# Patient Record
Sex: MALE | URBAN - METROPOLITAN AREA
[De-identification: names, ages, dates, MRNs, and addresses within clinical notes are randomized per-mention and may not be internally consistent; named-entity substitution may affect disease eponyms.]

---

## 2017-12-28 ENCOUNTER — TELEPHONE (OUTPATIENT)
Dept: BEHAVIORAL HEALTH | Facility: CLINIC | Age: 67
End: 2017-12-28

## 2017-12-28 ENCOUNTER — HOSPITAL ENCOUNTER (INPATIENT)
Facility: CLINIC | Age: 67
LOS: 13 days | Discharge: NURSING, REHAB OR LONG-TERM CARE | DRG: 884 | End: 2018-01-10
Attending: EMERGENCY MEDICINE | Admitting: PSYCHIATRY & NEUROLOGY
Payer: COMMERCIAL

## 2017-12-28 DIAGNOSIS — R46.89 PHYSICALLY AGGRESSIVE BEHAVIOR: ICD-10-CM

## 2017-12-28 DIAGNOSIS — R41.89 COGNITIVE IMPAIRMENT: ICD-10-CM

## 2017-12-28 DIAGNOSIS — R45.1 AGITATION: ICD-10-CM

## 2017-12-28 DIAGNOSIS — F84.0 ACTIVE AUTISTIC DISORDER: Primary | ICD-10-CM

## 2017-12-28 LAB
ANION GAP SERPL CALCULATED.3IONS-SCNC: 7 MMOL/L (ref 3–14)
BASOPHILS # BLD AUTO: 0 10E9/L (ref 0–0.2)
BASOPHILS NFR BLD AUTO: 0.3 %
BUN SERPL-MCNC: 14 MG/DL (ref 7–30)
CALCIUM SERPL-MCNC: 9.4 MG/DL (ref 8.5–10.1)
CHLORIDE SERPL-SCNC: 108 MMOL/L (ref 94–109)
CO2 SERPL-SCNC: 25 MMOL/L (ref 20–32)
CREAT SERPL-MCNC: 0.77 MG/DL (ref 0.66–1.25)
DIFFERENTIAL METHOD BLD: NORMAL
EOSINOPHIL # BLD AUTO: 0 10E9/L (ref 0–0.7)
EOSINOPHIL NFR BLD AUTO: 0.3 %
ERYTHROCYTE [DISTWIDTH] IN BLOOD BY AUTOMATED COUNT: 12.9 % (ref 10–15)
GFR SERPL CREATININE-BSD FRML MDRD: >90 ML/MIN/1.7M2
GLUCOSE SERPL-MCNC: 91 MG/DL (ref 70–99)
HCT VFR BLD AUTO: 43 % (ref 40–53)
HGB BLD-MCNC: 14.8 G/DL (ref 13.3–17.7)
IMM GRANULOCYTES # BLD: 0 10E9/L (ref 0–0.4)
IMM GRANULOCYTES NFR BLD: 0.2 %
LYMPHOCYTES # BLD AUTO: 1.1 10E9/L (ref 0.8–5.3)
LYMPHOCYTES NFR BLD AUTO: 17.8 %
MCH RBC QN AUTO: 31.9 PG (ref 26.5–33)
MCHC RBC AUTO-ENTMCNC: 34.4 G/DL (ref 31.5–36.5)
MCV RBC AUTO: 93 FL (ref 78–100)
MONOCYTES # BLD AUTO: 0.5 10E9/L (ref 0–1.3)
MONOCYTES NFR BLD AUTO: 8.2 %
NEUTROPHILS # BLD AUTO: 4.5 10E9/L (ref 1.6–8.3)
NEUTROPHILS NFR BLD AUTO: 73.2 %
NRBC # BLD AUTO: 0 10*3/UL
NRBC BLD AUTO-RTO: 0 /100
PLATELET # BLD AUTO: 211 10E9/L (ref 150–450)
POTASSIUM SERPL-SCNC: 4 MMOL/L (ref 3.4–5.3)
RBC # BLD AUTO: 4.64 10E12/L (ref 4.4–5.9)
SODIUM SERPL-SCNC: 140 MMOL/L (ref 133–144)
TSH SERPL DL<=0.005 MIU/L-ACNC: 1.36 MU/L (ref 0.4–4)
WBC # BLD AUTO: 6.1 10E9/L (ref 4–11)

## 2017-12-28 PROCEDURE — 99223 1ST HOSP IP/OBS HIGH 75: CPT | Performed by: NURSE PRACTITIONER

## 2017-12-28 PROCEDURE — 80048 BASIC METABOLIC PNL TOTAL CA: CPT | Performed by: EMERGENCY MEDICINE

## 2017-12-28 PROCEDURE — 99285 EMERGENCY DEPT VISIT HI MDM: CPT | Mod: 25

## 2017-12-28 PROCEDURE — 12400006 ZZH R&B MH INTERMEDIATE

## 2017-12-28 PROCEDURE — 85025 COMPLETE CBC W/AUTO DIFF WBC: CPT | Performed by: EMERGENCY MEDICINE

## 2017-12-28 PROCEDURE — 25000128 H RX IP 250 OP 636: Performed by: EMERGENCY MEDICINE

## 2017-12-28 PROCEDURE — 84443 ASSAY THYROID STIM HORMONE: CPT | Performed by: EMERGENCY MEDICINE

## 2017-12-28 PROCEDURE — 99207 ZZC APP CREDIT; MD BILLING SHARED VISIT: CPT | Performed by: NURSE PRACTITIONER

## 2017-12-28 PROCEDURE — 40000914 ZZH STATISTIC SITTER, DAY HOURS

## 2017-12-28 PROCEDURE — 25000132 ZZH RX MED GY IP 250 OP 250 PS 637: Performed by: PSYCHIATRY & NEUROLOGY

## 2017-12-28 PROCEDURE — 96372 THER/PROPH/DIAG INJ SC/IM: CPT

## 2017-12-28 RX ORDER — HALOPERIDOL 5 MG/ML
10 INJECTION INTRAMUSCULAR ONCE
Status: COMPLETED | OUTPATIENT
Start: 2017-12-28 | End: 2017-12-28

## 2017-12-28 RX ORDER — CHLORHEXIDINE GLUCONATE ORAL RINSE 1.2 MG/ML
15 SOLUTION DENTAL 2 TIMES DAILY
Status: DISCONTINUED | OUTPATIENT
Start: 2017-12-28 | End: 2018-01-10 | Stop reason: HOSPADM

## 2017-12-28 RX ORDER — RISPERIDONE 1 MG/1
1 TABLET, ORALLY DISINTEGRATING ORAL EVERY MORNING
Status: DISCONTINUED | OUTPATIENT
Start: 2017-12-29 | End: 2018-01-10 | Stop reason: HOSPADM

## 2017-12-28 RX ORDER — TRIAMCINOLONE ACETONIDE 1 MG/G
CREAM TOPICAL 3 TIMES DAILY PRN
COMMUNITY

## 2017-12-28 RX ORDER — TRIAMCINOLONE ACETONIDE 1 MG/G
CREAM TOPICAL 3 TIMES DAILY PRN
Status: DISCONTINUED | OUTPATIENT
Start: 2017-12-28 | End: 2018-01-10 | Stop reason: HOSPADM

## 2017-12-28 RX ORDER — OLANZAPINE 5 MG/1
5-10 TABLET, ORALLY DISINTEGRATING ORAL EVERY 6 HOURS PRN
Status: DISCONTINUED | OUTPATIENT
Start: 2017-12-28 | End: 2018-01-02 | Stop reason: CLARIF

## 2017-12-28 RX ORDER — LEVOTHYROXINE SODIUM 50 UG/1
50 TABLET ORAL DAILY
Status: DISCONTINUED | OUTPATIENT
Start: 2017-12-29 | End: 2018-01-10 | Stop reason: HOSPADM

## 2017-12-28 RX ORDER — RISPERIDONE 1 MG/1
3 TABLET, ORALLY DISINTEGRATING ORAL AT BEDTIME
Status: DISCONTINUED | OUTPATIENT
Start: 2017-12-28 | End: 2018-01-10 | Stop reason: HOSPADM

## 2017-12-28 RX ORDER — DIPHENHYDRAMINE HCL 25 MG
50 CAPSULE ORAL EVERY 6 HOURS PRN
Status: DISCONTINUED | OUTPATIENT
Start: 2017-12-28 | End: 2018-01-10 | Stop reason: HOSPADM

## 2017-12-28 RX ORDER — BENZOCAINE/MENTHOL 6 MG-10 MG
LOZENGE MUCOUS MEMBRANE 4 TIMES DAILY PRN
Status: DISCONTINUED | OUTPATIENT
Start: 2017-12-28 | End: 2018-01-10 | Stop reason: HOSPADM

## 2017-12-28 RX ORDER — LORAZEPAM 2 MG/ML
2 INJECTION INTRAMUSCULAR ONCE
Status: COMPLETED | OUTPATIENT
Start: 2017-12-28 | End: 2017-12-28

## 2017-12-28 RX ORDER — SIMVASTATIN 10 MG
20 TABLET ORAL AT BEDTIME
Status: DISCONTINUED | OUTPATIENT
Start: 2017-12-28 | End: 2018-01-10 | Stop reason: HOSPADM

## 2017-12-28 RX ORDER — DORZOLAMIDE HYDROCHLORIDE AND TIMOLOL MALEATE 20; 5 MG/ML; MG/ML
1 SOLUTION/ DROPS OPHTHALMIC
Status: DISCONTINUED | OUTPATIENT
Start: 2017-12-28 | End: 2018-01-10 | Stop reason: HOSPADM

## 2017-12-28 RX ORDER — METOPROLOL SUCCINATE 25 MG/1
25 TABLET, EXTENDED RELEASE ORAL DAILY
Status: DISCONTINUED | OUTPATIENT
Start: 2017-12-29 | End: 2018-01-10 | Stop reason: HOSPADM

## 2017-12-28 RX ORDER — DIVALPROEX SODIUM 500 MG/1
1000 TABLET, EXTENDED RELEASE ORAL ONCE
Status: COMPLETED | OUTPATIENT
Start: 2017-12-28 | End: 2017-12-28

## 2017-12-28 RX ORDER — OLANZAPINE 10 MG/2ML
5-10 INJECTION, POWDER, FOR SOLUTION INTRAMUSCULAR EVERY 6 HOURS PRN
Status: DISCONTINUED | OUTPATIENT
Start: 2017-12-28 | End: 2018-01-01

## 2017-12-28 RX ORDER — RISPERIDONE 1 MG/1
3 TABLET, ORALLY DISINTEGRATING ORAL ONCE
Status: COMPLETED | OUTPATIENT
Start: 2017-12-28 | End: 2017-12-28

## 2017-12-28 RX ORDER — SIMVASTATIN 10 MG
20 TABLET ORAL DAILY
Status: DISCONTINUED | OUTPATIENT
Start: 2017-12-29 | End: 2017-12-28

## 2017-12-28 RX ADMIN — HALOPERIDOL LACTATE 10 MG: 5 INJECTION, SOLUTION INTRAMUSCULAR at 10:52

## 2017-12-28 RX ADMIN — RISPERIDONE 3 MG: 1 TABLET, ORALLY DISINTEGRATING ORAL at 14:01

## 2017-12-28 RX ADMIN — DIPHENHYDRAMINE HYDROCHLORIDE 50 MG: 25 CAPSULE ORAL at 14:44

## 2017-12-28 RX ADMIN — LORAZEPAM 2 MG: 2 INJECTION INTRAMUSCULAR; INTRAVENOUS at 10:52

## 2017-12-28 RX ADMIN — DIVALPROEX SODIUM 1000 MG: 500 TABLET, EXTENDED RELEASE ORAL at 14:01

## 2017-12-28 RX ADMIN — SIMVASTATIN 20 MG: 10 TABLET, FILM COATED ORAL at 20:44

## 2017-12-28 RX ADMIN — OLANZAPINE 10 MG: 5 TABLET, ORALLY DISINTEGRATING ORAL at 18:18

## 2017-12-28 RX ADMIN — RISPERIDONE 3 MG: 1 TABLET, ORALLY DISINTEGRATING ORAL at 20:44

## 2017-12-28 RX ADMIN — DORZOLAMIDE HYDROCHLORIDE AND TIMOLOL MALEATE 1 DROP: 20; 5 SOLUTION/ DROPS OPHTHALMIC at 17:08

## 2017-12-28 ASSESSMENT — ACTIVITIES OF DAILY LIVING (ADL)
GROOMING: WITH ASSISTANCE
LAUNDRY: WITH SUPERVISION
ORAL_HYGIENE: WITH ASSISTANCE
DRESS: WITH ASSISTANCE

## 2017-12-28 NOTE — PROGRESS NOTES
Direct admit from [facility] and is from a Group Home. Has a medical history of blind and developmentally delayed. For past month has been acting out,yelling loudly,not sleeping, stripping down naked and defecating on floor. Long time care by Dr Kwong. Legal guardian is Bernadette Raymundo [phone]. Group Home is [phone] and [facility] is [phone]. Patient  need constant care to monitor patient's safety.    Wel 77.Welcome packet reviewed with patient. Information reviewed includes getting emergency help, preventing infections, understanding your care, using medication safely, reducing falls, preventing pressure ulcers, smoking cessation, powerful choices and Patients Bill of Rights. Pt. given tour of the unit and instruction on use of facility including emergency call light. Program schedule reviewed with patient. Questions regarding the unit addressed. Pt. Search completed and belongings inventoried.      Adm 77...Nursing assessment complete including patient and medication profiles. Risk assessments completed addressing suicide,fall,skin,nutrition and safety issues. Care plan initiated. Assessments reviewed with physician and admit orders received.

## 2017-12-28 NOTE — ED PROVIDER NOTES
History     Chief Complaint:  Psychiatric Evaluation        HPI   Hx limited by his baseline condition.  Supplemented by review of his electronic charts and discussion with his psychiatrist and group home staff.    Hugh Molina is a 67 year old male who presents with his group home staff for mental health evaluation.  He has a history of mental retardation and blindness and is followed by Dr. Justice Castaneda with psychiatry, who has been titrating his risperidone doses recently.  The patient has a history of difficult to control behavior, such as rolling around on the ground inappropriately.  Per group home staff, these behaviors have become more frequent in the past few months.  More recently he has been punching walls frequently, undressing at inappropriate times, and defecating on the floor of his facility.  His staff was in touch with Dr. Castnaeda, who directed them to the [facility] for hospitalization based on these worrisome symptoms.    Allergies:      NKDA    Medications:      No current outpatient prescriptions on file.    Past Medical History:    Past Medical History:   Diagnosis Date     Autistic disorder      Blind      MR (mental retardation)        Patient Active Problem List    Diagnosis Date Noted     Agitation 12/28/2017     Priority: Medium        Past Surgical History:    Past Surgical History:   Procedure Laterality Date     EXAM UNDER ANESTHESIA DENTAL  9/28/2012    Procedure: EXAM UNDER ANESTHESIA DENTAL;  Comprehensive Exam under Anesthesia, Full set of xrays, Periodontal Therapy;  Surgeon: Carmelita Kaminski DDS;  Location: [facility] OR     NO HISTORY OF SURGERY          Family History:    family history is not on file.    Social History:   reports that he has never smoked. He does not have any smokeless tobacco history on file. He reports that he does not drink alcohol or use illicit drugs.    PCP: Gregory Pool A     Review of Systems   Unable to perform ROS: Psychiatric disorder  "        Physical Exam     Patient Vitals for the past 24 hrs:   BP Temp Temp src Pulse Resp SpO2   12/28/17 1204 116/67 97.9  F (36.6  C) Temporal 84 18 96 %        Physical Exam  General: male walking in agitated manner in common space of mental health unit, 2 helpful female group home staff and security nearby, patient appears slightly disheveled  HENT: mucous membranes moist  CV: extremities well perfused, regular rhythm, regular rate  Resp:  normal effort, clear throughout  GI: abdomen soft and nontender, no guarding  MSK: no bony tenderness, no evidence of recent trauma  Skin: appropriately warm and dry  Neuro: walks with steady gait, blind at baseline,  symmetric, unable to follow elaborate commands, occasionally makes single word statements, such as \"shot\" while rolling up his shirt sleeve and motioning toward deltoid  Psych: moderately agitated, at times difficult to redirect, unable to fully assess due to verbal limitations    Emergency Department Course     Laboratory:    Labs Ordered and Resulted from Time of ED Arrival Up to the Time of Departure from the ED   BASIC METABOLIC PANEL   CBC WITH PLATELETS DIFFERENTIAL   TSH WITH FREE T4 REFLEX   CARDIAC CONTINUOUS MONITORING   PERIPHERAL IV CATHETER        Interventions:  Haldol 10mg IM  Ativan 2mg IM     Emergency Department Course:  Past medical records, nursing notes, and vitals reviewed.    I spoke with Dr. Dotson, Psychiatrist, immediately prior to evaluating the patient in the behavioral health suite.  He relayed his request for hospitalization based on his knowledge of the patient including recent decompensation.  He recommends 10 mg of IM Haldol and 2 mg of IM Ativan    I performed an exam of the patient and obtained history, as documented above.    I reexamined the patient while after medications were administered.  He was still awake and talking though now much more calm.  No evidence of respiratory compromise or significant adverse " sedation.    He was admitted to an [facility] bed at [facility] on the mental health unit.    Impression & Plan       Medical Decision Making:  This gentleman with a complex history presents with gradual decompensation of his mental health leading to aggressive and agitated behaviors.  This is despite efforts by his outpatient team to titrate his medication regimen.  His psychiatrist and I agree that hospitalization is now appropriate.  Medical causes such as electrolyte abnormalities, infection, and intracranial hemorrhage were considered and are either ruled out by testing or are not suspected.  I consider him medically appropriate for mental health hospitalization.        Diagnosis:    ICD-10-CM    1. Physically aggressive behavior R46.89 Basic metabolic panel     TSH with free T4 reflex   2. Agitation R45.1    3. Cognitive impairment R41.89         12/28/2017   Bobby Maradiaga, *        Bobby Maradiaga MD  12/28/17 1522

## 2017-12-28 NOTE — PROGRESS NOTES
12/28/17 1308   Patient Belongings   Patient Belongings other (see comments)   Disposition of Belongings [facility]   Belongings Search Yes   Clothing Search Yes   Second Staff Tan     Winter coat  Duglas sweat pants with strings  T-shirt  Long sleeve  Shoes with laces  Pair of socks    ..A               Admission:  I am responsible for any personal items that are not sent to the safe or pharmacy.  [facility] is not responsible for loss, theft or damage of any property in my possession.    Signature:  _________________________________ Date: _______  Time: _____                                              Staff Signature:  ____________________________ Date: ________  Time: _____      2nd Staff person, if patient is unable/unwilling to sign:    Signature: ________________________________ Date: ________  Time: _____     Discharge:  [facility] has returned all of my personal belongings:    Signature: _________________________________ Date: ________  Time: _____                                          Staff Signature:  ____________________________ Date: ________  Time: _____

## 2017-12-28 NOTE — ED NOTES
Bed: [facility]  Expected date:   Expected time:   Means of arrival:   Comments:  triage

## 2017-12-28 NOTE — PROGRESS NOTES
I was asked by RN staff to evaluate patient following initiation of violent restraints x5 points.    Right sided extremity restraints were requested to be loosened slightly so as to fit 1-2 fingerbreaths easily under restraints which was completed in my presence.  L sided restraints and chest/axillary restraints were acceptable.    Staff shared w/ me protocol re: toileting, turning, circulation & skin checks.  Pt was calm at the time of the limited exam, wakes to verbal stimulation, had been recently sedated for risk of injury to self & others.     In person face to face assessment completed, including an evaluation of the patient's immediate reaction to the intervention,   The intervention of restraint needs to CONTINUE.

## 2017-12-28 NOTE — PROGRESS NOTES
"   12/28/17 1500   Seclusion or Restraint Order   In Person Face to Face Assessment Conducted Yes-Eval of pt's immediate situation, reaction to intervention, complete review of systems assessment, behavioral assessment & review/assessment of hx, drugs & meds, recent labs, etc, behavioral condition, need to continue/terminate restraint/seclusion   Patient was walking with staff. Patient kept on yelling \"van ride.\" Patient did not respond to reassurance. Began yelling and swinging at staff. Patient also hit himself in the head. Patient was placed in restraints for the safety of self and others. Patient was relatively cooperative through this process.  "

## 2017-12-28 NOTE — PROGRESS NOTES
Direct admit from [facility] and is from a Group Home. Has a medical history of blind and developmentally delayed. For past month has been acting out,yelling loudly,not sleeping, stripping down naked and defecating on floor. Long time care by Dr Kwong. Legal guardian is Bernadette Raymundo [phone]. Group Home is [phone] and [facility] is [phone]. Group Home address is [address] [place] [address]. Patient  needs constant supervision monitor patient's safety. Staff from [facility] called and stated they do not have any Care Plan for Jovon.  Adm 77..Nursing assessment complete including patient and medication profiles. Risk assessments completed addressing suicide,fall,skin,nutrition and safety issues. Care plan initiated. Assessments reviewed with physician and admit orders received.   Wel 77.Welcome packet reviewed with patient. Information reviewed includes getting emergency help, preventing infections, understanding your care, using medication safely, reducing falls, preventing pressure ulcers, smoking cessation, powerful choices and Patients Bill of Rights. Pt. given tour of the unit and instruction on use of facility including emergency call light. Program schedule reviewed with patient. Questions regarding the unit addressed. Pt. Search completed and belongings inventoried.    .

## 2017-12-28 NOTE — TELEPHONE ENCOUNTER
S: PT is a 68yo male that will be BIB group home staff to [facility] ED for IP MH     B: Dr. Kwong called and wants this pt admitted to [facility]. PT is currently residing at a group home. Pt has developmentally delayed and blind. Pt has a history of aggression. PT has also been known to be very loud, can strip naked, and defecate on the floor.     A:     R: Pt accepted to station 77 under [facility].

## 2017-12-28 NOTE — PHARMACY-ADMISSION MEDICATION HISTORY
Admission medication history interview status for the 12/28/2017  admission is complete. See EPIC admission navigator for prior to admission medications     Medication history source reliability:Good    Actions taken by pharmacist (provider contacted, etc): list from group home used     Additional medication history information not noted on PTA med list :None    Medication reconciliation/reorder completed by provider prior to medication history? No    Time spent in this activity: 20 min    Prior to Admission medications    Medication Sig Last Dose Taking? Auth Provider   LEVOTHYROXINE SODIUM PO Take 50 mcg by mouth daily 12/27/2017 at 0700 Yes Unknown, Entered By History   METOPROLOL SUCCINATE ER PO Take 25 mg by mouth daily 12/27/2017 at 0730 Yes Unknown, Entered By History   RISPERIDONE PO Take 1 mg by mouth every morning 12/27/2017 at 0700 Yes Unknown, Entered By History   triamcinolone (KENALOG) 0.1 % cream Apply topically 3 times daily as needed for irritation (rash) unknown prn Yes Unknown, Entered By History   Cholecalciferol (VITAMIN D3 PO) Take 2,000 Units by mouth daily  12/27/2017 at 0730 Yes Reported, Patient   chlorhexidine (PERIDEX) 0.12 % solution Take 15 mLs by mouth 2 times daily  12/27/2017 at 0730 Yes Reported, Patient   SIMVASTATIN PO Take 20 mg by mouth daily.   12/26/2017 at 2000 Yes Reported, Patient   dorzolamide-timolol (COSOPT) 2-0.5 % ophthalmic solution Place 1 drop into the right eye 2 times daily. 12/27/2017 at 0730 Yes Reported, Patient   hydrocortisone 1 % cream Apply topically 4 times daily as needed for itching  unknown prn Yes Reported, Patient   DiphenhydrAMINE HCl (BENADRYL PO) Take 50 mg by mouth every 6 hours as needed for itching (and rash)  unknown prn Yes Reported, Patient   RISPERIDONE PO Take 3 mg by mouth At Bedtime  12/26/2017 at 2000  Reported, Patient

## 2017-12-28 NOTE — PLAN OF CARE
Problem: Patient Care Overview  Goal: Team Discussion  Team Plan:   Outcome: No Change  BEHAVIORAL TEAM DISCUSSION    Participants: Dr. Kwong, nursing staff, , psych associates  Progress: Pt remains agitated, screaming. Displays inappropriate behaviors at times such as taking clothes off, defecating on the floor. Known to fall backwards at times. Non-responsive to questions.   Continued Stay Criteria/Rationale: Remains agitated, screaming, displays inappropriate behavior.   Medical/Physical: DD, blind   Precautions:   Behavioral Orders   Procedures     Assault precautions     Code 1 - Restrict to Unit     Routine Programming     As clinically indicated     Status 15     Every 15 minutes.     Plan: Stabilize on medications, and continue to monitor until reaches baseline (whirls/twirls around happily when at baseline).  Rationale for change in precautions or plan: Remains agitated, screaming, displays inappropriate behavior.

## 2017-12-28 NOTE — IP AVS SNAPSHOT
[place] [address] [place] [address]    Phone:  [phone]                                       After Visit Summary   12/28/2017    Hugh Molina    MRN: [id number]           After Visit Summary Signature Page     I have received my discharge instructions, and my questions have been answered. I have discussed any challenges I see with this plan with the nurse or doctor.    ..........................................................................................................................................  Patient/Patient Representative Signature      ..........................................................................................................................................  Patient Representative Print Name and Relationship to Patient    ..................................................               ................................................  Date                                            Time    ..........................................................................................................................................  Reviewed by Signature/Title    ...................................................              ..............................................  Date                                                            Time

## 2017-12-28 NOTE — IP AVS SNAPSHOT
MRN:[id number]                      After Visit Summary   12/28/2017    Hugh Molina    MRN: [id number]           Thank you!     Thank you for choosing [facility] for your care. Our goal is always to provide you with excellent care.        Patient Information     Date Of Birth          1950        Designated Caregiver       Most Recent Value    Caregiver    Will someone help with your care after discharge? yes    Name of designated caregiver Group Home    Phone number of caregiver [phone]    Caregiver address [place]      About your hospital stay     You were admitted on:  December 28, 2017 You last received care in the:  [facility]    You were discharged on:  January 10, 2018       Who to Call     For medical emergencies, please call 911.  For non-urgent questions about your medical care, please call your primary care provider or clinic, [phone]          Attending Provider     Provider Bobby Javed MD Emergency Medicine    [facility], Justice DICKINSON MD Psychiatry       Primary Care Provider Office Phone # Fax #    Gregory Pool [phone]      Further instructions from your care team       Behavioral Discharge Planning and Instructions    Summary:  Admitted with increasing aggressive behavior, agitation, inappropriate behavior and insomnia from his group home    Main Diagnosis:   Autism Spectrum; Intellectual Disability formerly known as MR; Likely Dementia; Blind    Major Treatments, Procedures and Findings:  Psychiatric assessment. Medication adjustment.     Symptoms to Report: Feeling more aggressive, Increased confusion, Losing more sleep, Mood getting worse or Thoughts of suicide    Lifestyle Adjustment:  Follow all treatment recommendations, including going to all doctor appointments and taking medications as prescribed. Develop and follow safety plan with group home staff.     Psychiatry Follow-up:     Your  "group home staff will assist you in setting up a follow up appointment with Dr. Justice Kwong at [facility] within the next 30 days so that you can get your medications refilled.     [facility]  [address], [address]  [place]  [address]  Phone:  [phone] / Fax:  [phone]    Your legal guardian is Bernadette Fonseca. She can be reached at [phone].     You will be returning to your current group home.  Main number for group home is [phone]. Contact at your group home is Becky and she can be reached at [phone].     Medications will be sent to [facility] in [place].     Resources:   Crisis Intervention: 535.197.8567 or 428-271-4631 (TTY: 319.682.9951).  Call anytime for help.  National [place] on Mental Illness (www.[place].laron.org): 219.340.7551 or 448-405-2240.  National Suicide Prevention Line (www.mentalhealthmn.org): 074-891-OSEB (8785)  COPE (Crisis Services for Adults) in [facility] [place]: 639.151.8131 (Available 24/7)    General Medication Instructions:   See your medication sheet(s) for instructions.   Take all medicines as directed.  Make no changes unless your doctor suggests them.   Go to all your doctor visits.  Be sure to have all your required lab tests. This way, your medicines can be refilled on time.  Do not use any drugs not prescribed by your doctor.  Avoid alcohol.    Weight: 1/3/18 165.8 - 169.98 1/10/18    To access medical records, please have the guardian fill out the \"Authorization for Protected Health Information.\" The number for Medical records is [phone]    Pending Results     No orders found from 12/26/2017 to 12/29/2017.            Statement of Approval     Ordered          01/09/18 1901  I have reviewed and agree with all the recommendations and orders detailed in this document.  EFFECTIVE NOW     Approved and electronically signed by:  Justice Kwong MD             Admission Information     Date & Time Provider " "Department Dept. Phone    2017 Justice Kwong MD [facility] [phone]      Your Vitals Were     Blood Pressure Pulse Temperature Respirations Height Weight    121/62 79 98.7  F (37.1  C) (Axillary) 17 1.778 m (5' 10\") 76.1 kg (167 lb 11.2 oz)    Pulse Oximetry BMI (Body Mass Index)                91% 24.06 kg/m2          [employer] Information     [employer] lets you send messages to your doctor, view your test results, renew your prescriptions, schedule appointments and more. To sign up, go to [email or web address] . Click on \"Log in\" on the left side of the screen, which will take you to the Welcome page. Then click on \"Sign up Now\" on the right side of the page.     You will be asked to enter the access code listed below, as well as some personal information. Please follow the directions to create your username and password.     Your access code is: [email or web address]  Expires: 2018  3:30 PM     Your access code will  in 90 days. If you need help or a new code, please call your [facility] clinic or [phone].        Care EveryWhere ID     This is your Care EveryWhere ID. This could be used by other organizations to access your [facility] medical records  [id number]        Equal Access to Services     [facility] AH: Hadii laurie taylor Sobahman, waaxda luqadaha, qaybta kaalmaarminda james, sharif will . [facility] [phone].    ATENCIÓN: Si habla español, tiene a jimenez disposición servicios gratuitos de asistencia lingüística. Llame al [phone].    We comply with applicable federal civil rights laws and [place] laws. We do not discriminate on the basis of race, color, national origin, age, disability, sex, sexual orientation, or gender identity.               Review of your medicines      START taking        Dose / Directions    amoxicillin-clavulanate 875-125 MG per tablet   Commonly known as:  AUGMENTIN   Indication:  Pneumonia caused by " Inhaling a Substance Into the Lungs   Used for:  Meconium aspiration pneumonia, unspecified laterality, unspecified part of lung        Dose:  1 tablet   Take 1 tablet by mouth every 12 hours   Quantity:  1 tablet   Refills:  0       divalproex 500 MG 24 hr tablet   Commonly known as:  DEPAKOTE ER   Used for:  Active autistic disorder        Dose:  1000 mg   Take 2 tablets (1,000 mg) by mouth every evening   Quantity:  60 tablet   Refills:  0       QUEtiapine 100 MG tablet   Commonly known as:  SEROquel   Used for:  Active autistic disorder        Dose:  100 mg   Take 1 tablet (100 mg) by mouth 3 times daily   Quantity:  90 tablet   Refills:  0         CONTINUE these medicines which may have CHANGED, or have new prescriptions. If we are uncertain of the size of tablets/capsules you have at home, strength may be listed as something that might have changed.        Dose / Directions    * risperiDONE 3 MG Tbdp ODT tab   This may have changed:    - medication strength  - how to take this   Used for:  Active autistic disorder        Dose:  3 mg   Place 1 tablet (3 mg) under the tongue At Bedtime   Quantity:  30 tablet   Refills:  0       * risperiDONE 1 MG ODT tab   Commonly known as:  risperDAL M-TABS   This may have changed:  medication strength   Used for:  Active autistic disorder        Dose:  1 mg   Take 1 tablet (1 mg) by mouth every morning   Quantity:  30 tablet   Refills:  0       * Notice:  This list has 2 medication(s) that are the same as other medications prescribed for you. Read the directions carefully, and ask your doctor or other care provider to review them with you.      CONTINUE these medicines which have NOT CHANGED        Dose / Directions    BENADRYL PO        Dose:  50 mg   Take 50 mg by mouth every 6 hours as needed for itching (and rash)   Refills:  0       chlorhexidine 0.12 % solution   Commonly known as:  PERIDEX        Dose:  15 mL   Take 15 mLs by mouth 2 times daily   Refills:  0        COSOPT 2-0.5 % ophthalmic solution   Generic drug:  dorzolamide-timolol        Dose:  1 drop   Place 1 drop into the right eye 2 times daily.   Refills:  0       hydrocortisone 1 % cream   Commonly known as:  CORTAID        Apply topically 4 times daily as needed for itching   Refills:  0       LEVOTHYROXINE SODIUM PO        Dose:  50 mcg   Take 50 mcg by mouth daily   Refills:  0       METOPROLOL SUCCINATE ER PO        Dose:  25 mg   Take 25 mg by mouth daily   Refills:  0       SIMVASTATIN PO        Dose:  20 mg   Take 20 mg by mouth daily.   Refills:  0       triamcinolone 0.1 % cream   Commonly known as:  KENALOG        Apply topically 3 times daily as needed for irritation (rash)   Refills:  0       VITAMIN D3 PO        Dose:  2000 Units   Take 2,000 Units by mouth daily   Refills:  0            Where to get your medicines      These medications were sent to [facility] - [place], [place] - [address]  [address], [facility] [address]     Phone:  [phone]     amoxicillin-clavulanate 875-125 MG per tablet    divalproex 500 MG 24 hr tablet    QUEtiapine 100 MG tablet    risperiDONE 1 MG ODT tab    risperiDONE 3 MG Tbdp ODT tab               ANTIBIOTIC INSTRUCTION     You've Been Prescribed an Antibiotic - Now What?  Your healthcare team thinks that you or your loved one might have an infection. Some infections can be treated with antibiotics, which are powerful, life-saving drugs. Like all medications, antibiotics have side effects and should only be used when necessary. There are some important things you should know about your antibiotic treatment.      Your healthcare team may run tests before you start taking an antibiotic.    Your team may take samples (e.g., from your blood, urine or other areas) to run tests to look for bacteria. These test can be important to determine if you need an antibiotic at all and, if you do, which antibiotic will work best.      Within a few  days, your healthcare team might change or even stop your antibiotic.    Your team may start you on an antibiotic while they are working to find out what is making you sick.    Your team might change your antibiotic because test results show that a different antibiotic would be better to treat your infection.    In some cases, once your team has more information, they learn that you do not need an antibiotic at all. They may find out that you don't have an infection, or that the antibiotic you're taking won't work against your infection. For example, an infection caused by a virus can't be treated with antibiotics. Staying on an antibiotic when you don't need it is more likely to be harmful than helpful.      You may experience side effects from your antibiotic.    Like all medications, antibiotics have side effects. Some of these can be serious.    Let you healthcare team know if you have any known allergies when you are admitted to the hospital.    One significant side effect of nearly all antibiotics is the risk of severe and sometimes deadly diarrhea caused by Clostridium difficile (C. Difficile). This occurs when a person takes antibiotics because some good germs are destroyed. Antibiotic use allows C. diificile to take over, putting patients at high risk for this serious infection.    As a patient or caregiver, it is important to understand your or your loved one's antibiotic treatment. It is especially important for caregivers to speak up when patients can't speak for themselves. Here are some important questions to ask your healthcare team.    What infection is this antibiotic treating and how do you know I have that infection?    What side effects might occur from this antibiotic?    How long will I need to take this antibiotic?    Is it safe to take this antibiotic with other medications or supplements (e.g., vitamins) that I am taking?     Are there any special directions I need to know about taking this  antibiotic? For example, should I take it with food?    How will I be monitored to know whether my infection is responding to the antibiotic?    What tests may help to make sure the right antibiotic is prescribed for me?      Information provided by:  www.cdc.gov/getsmart  U.S. Department of Health and Human Services  Centers for disease Control and Prevention  National Center for Emerging and Zoonotic Infectious Diseases  Division of Healthcare Quality Promotion         Protect others around you: Learn how to safely use, store and throw away your medicines at www.disposemymeds.org.             Medication List: This is a list of all your medications and when to take them. Check marks below indicate your daily home schedule. Keep this list as a reference.      Medications           Morning Afternoon Evening Bedtime As Needed    amoxicillin-clavulanate 875-125 MG per tablet   Commonly known as:  AUGMENTIN   Take 1 tablet by mouth every 12 hours   Last time this was given:  1 tablet on 1/10/2018  8:27 AM                                BENADRYL PO   Take 50 mg by mouth every 6 hours as needed for itching (and rash)   Last time this was given:  50 mg on 1/1/2018  7:50 AM                                chlorhexidine 0.12 % solution   Commonly known as:  PERIDEX   Take 15 mLs by mouth 2 times daily   Last time this was given:  15 mLs on 1/10/2018  8:27 AM                                COSOPT 2-0.5 % ophthalmic solution   Place 1 drop into the right eye 2 times daily.   Last time this was given:  1 drop on 1/10/2018  8:27 AM   Generic drug:  dorzolamide-timolol                                divalproex 500 MG 24 hr tablet   Commonly known as:  DEPAKOTE ER   Take 2 tablets (1,000 mg) by mouth every evening   Last time this was given:  1,000 mg on 1/9/2018  9:17 PM                                hydrocortisone 1 % cream   Commonly known as:  CORTAID   Apply topically 4 times daily as needed for itching                                 LEVOTHYROXINE SODIUM PO   Take 50 mcg by mouth daily   Last time this was given:  50 mcg on 1/10/2018  8:27 AM                                METOPROLOL SUCCINATE ER PO   Take 25 mg by mouth daily   Last time this was given:  25 mg on 1/10/2018  8:27 AM                                QUEtiapine 100 MG tablet   Commonly known as:  SEROquel   Take 1 tablet (100 mg) by mouth 3 times daily   Last time this was given:  100 mg on 1/10/2018  8:27 AM                                * risperiDONE 3 MG Tbdp ODT tab   Place 1 tablet (3 mg) under the tongue At Bedtime   Last time this was given:  1 mg on 1/10/2018  8:27 AM                                * risperiDONE 1 MG ODT tab   Commonly known as:  risperDAL M-TABS   Take 1 tablet (1 mg) by mouth every morning   Last time this was given:  1 mg on 1/10/2018  8:27 AM                                SIMVASTATIN PO   Take 20 mg by mouth daily.   Last time this was given:  20 mg on 1/9/2018  9:17 PM                                triamcinolone 0.1 % cream   Commonly known as:  KENALOG   Apply topically 3 times daily as needed for irritation (rash)                                VITAMIN D3 PO   Take 2,000 Units by mouth daily   Last time this was given:  2,000 Units on 1/10/2018  8:27 AM                                * Notice:  This list has 2 medication(s) that are the same as other medications prescribed for you. Read the directions carefully, and ask your doctor or other care provider to review them with you.

## 2017-12-29 LAB
ALBUMIN UR-MCNC: 10 MG/DL
APPEARANCE UR: ABNORMAL
BILIRUB UR QL STRIP: NEGATIVE
COLOR UR AUTO: YELLOW
GLUCOSE UR STRIP-MCNC: NEGATIVE MG/DL
HGB UR QL STRIP: NEGATIVE
KETONES UR STRIP-MCNC: 10 MG/DL
LEUKOCYTE ESTERASE UR QL STRIP: ABNORMAL
MUCOUS THREADS #/AREA URNS LPF: PRESENT /LPF
NITRATE UR QL: NEGATIVE
PH UR STRIP: 6 PH (ref 5–7)
RBC #/AREA URNS AUTO: 2 /HPF (ref 0–2)
SOURCE: ABNORMAL
SP GR UR STRIP: 1.02 (ref 1–1.03)
SQUAMOUS #/AREA URNS AUTO: <1 /HPF (ref 0–1)
UROBILINOGEN UR STRIP-MCNC: 2 MG/DL (ref 0–2)
WBC #/AREA URNS AUTO: 3 /HPF (ref 0–2)

## 2017-12-29 PROCEDURE — 25000132 ZZH RX MED GY IP 250 OP 250 PS 637: Performed by: PSYCHIATRY & NEUROLOGY

## 2017-12-29 PROCEDURE — 40000915 ZZH STATISTIC SITTER, EVENING HOURS

## 2017-12-29 PROCEDURE — 87086 URINE CULTURE/COLONY COUNT: CPT | Performed by: PHYSICIAN ASSISTANT

## 2017-12-29 PROCEDURE — 25000132 ZZH RX MED GY IP 250 OP 250 PS 637: Performed by: NURSE PRACTITIONER

## 2017-12-29 PROCEDURE — 81001 URINALYSIS AUTO W/SCOPE: CPT | Performed by: PSYCHIATRY & NEUROLOGY

## 2017-12-29 PROCEDURE — 12400006 ZZH R&B MH INTERMEDIATE

## 2017-12-29 PROCEDURE — 25000125 ZZHC RX 250: Performed by: PSYCHIATRY & NEUROLOGY

## 2017-12-29 PROCEDURE — 27210995 ZZH RX 272

## 2017-12-29 RX ORDER — WATER 10 ML/10ML
INJECTION INTRAMUSCULAR; INTRAVENOUS; SUBCUTANEOUS
Status: COMPLETED
Start: 2017-12-29 | End: 2017-12-29

## 2017-12-29 RX ORDER — ACETAMINOPHEN 325 MG/1
650 TABLET ORAL EVERY 4 HOURS PRN
Status: DISCONTINUED | OUTPATIENT
Start: 2017-12-29 | End: 2018-01-10 | Stop reason: HOSPADM

## 2017-12-29 RX ORDER — DIVALPROEX SODIUM 500 MG/1
500 TABLET, EXTENDED RELEASE ORAL EVERY EVENING
Status: DISCONTINUED | OUTPATIENT
Start: 2017-12-29 | End: 2017-12-29

## 2017-12-29 RX ORDER — DIVALPROEX SODIUM 500 MG/1
1000 TABLET, EXTENDED RELEASE ORAL EVERY EVENING
Status: DISCONTINUED | OUTPATIENT
Start: 2017-12-29 | End: 2018-01-10 | Stop reason: HOSPADM

## 2017-12-29 RX ORDER — HYDROXYZINE HYDROCHLORIDE 25 MG/1
25-50 TABLET, FILM COATED ORAL EVERY 4 HOURS PRN
Status: DISCONTINUED | OUTPATIENT
Start: 2017-12-29 | End: 2018-01-10 | Stop reason: HOSPADM

## 2017-12-29 RX ORDER — QUETIAPINE FUMARATE 100 MG/1
100 TABLET, FILM COATED ORAL 3 TIMES DAILY
Status: DISCONTINUED | OUTPATIENT
Start: 2017-12-29 | End: 2018-01-10 | Stop reason: HOSPADM

## 2017-12-29 RX ADMIN — Medication 15 ML: at 11:11

## 2017-12-29 RX ADMIN — DIVALPROEX SODIUM 1000 MG: 500 TABLET, EXTENDED RELEASE ORAL at 21:23

## 2017-12-29 RX ADMIN — METOPROLOL SUCCINATE 25 MG: 25 TABLET, EXTENDED RELEASE ORAL at 11:10

## 2017-12-29 RX ADMIN — OLANZAPINE 10 MG: 10 INJECTION, POWDER, FOR SOLUTION INTRAMUSCULAR at 18:42

## 2017-12-29 RX ADMIN — QUETIAPINE FUMARATE 100 MG: 100 TABLET ORAL at 11:11

## 2017-12-29 RX ADMIN — SIMVASTATIN 20 MG: 10 TABLET, FILM COATED ORAL at 21:24

## 2017-12-29 RX ADMIN — QUETIAPINE FUMARATE 100 MG: 100 TABLET ORAL at 15:37

## 2017-12-29 RX ADMIN — Medication 15 ML: at 22:14

## 2017-12-29 RX ADMIN — QUETIAPINE FUMARATE 100 MG: 100 TABLET ORAL at 21:23

## 2017-12-29 RX ADMIN — OLANZAPINE 10 MG: 5 TABLET, ORALLY DISINTEGRATING ORAL at 01:10

## 2017-12-29 RX ADMIN — VITAMIN D, TAB 1000IU (100/BT) 2000 UNITS: 25 TAB at 11:09

## 2017-12-29 RX ADMIN — DORZOLAMIDE HYDROCHLORIDE AND TIMOLOL MALEATE 1 DROP: 20; 5 SOLUTION/ DROPS OPHTHALMIC at 22:12

## 2017-12-29 RX ADMIN — WATER 2.1 ML: 1 INJECTION INTRAMUSCULAR; INTRAVENOUS; SUBCUTANEOUS at 13:00

## 2017-12-29 RX ADMIN — RISPERIDONE 3 MG: 1 TABLET, ORALLY DISINTEGRATING ORAL at 21:24

## 2017-12-29 RX ADMIN — OLANZAPINE 10 MG: 10 INJECTION, POWDER, FOR SOLUTION INTRAMUSCULAR at 13:00

## 2017-12-29 RX ADMIN — DIPHENHYDRAMINE HYDROCHLORIDE 50 MG: 25 CAPSULE ORAL at 16:15

## 2017-12-29 RX ADMIN — LEVOTHYROXINE SODIUM 50 MCG: 50 TABLET ORAL at 11:10

## 2017-12-29 RX ADMIN — RISPERIDONE 1 MG: 1 TABLET, ORALLY DISINTEGRATING ORAL at 11:10

## 2017-12-29 RX ADMIN — DORZOLAMIDE HYDROCHLORIDE AND TIMOLOL MALEATE 1 DROP: 20; 5 SOLUTION/ DROPS OPHTHALMIC at 11:11

## 2017-12-29 NOTE — PLAN OF CARE
Problem: General Rehab Plan of Care  Goal: Occupational Therapy Goals  The patient and/or their representative will achieve their patient-specific goals related to the plan of care.  The patient-specific goals include:  INITIAL O.T. ASSESSMENT   Details:  Pt has been unable to attend group secondary to aggressive behavior toward staff. Per staff request, pt was provided with beads, play darnell, and stress balls from OT for tactile stimulation.

## 2017-12-29 NOTE — PROGRESS NOTES
Around 0715 pt stated he needed to go to the bathroom. When staff went to assist pt to the bathroom he proceeded to continually strike out, dig his nails into and scratch staff's arms, and attempted to bite and spit on staff.  At that time staff called security to assist with placing pt in restraints. Pt is currently in 5 point restraints with a 1:1 sit at bedside.

## 2017-12-29 NOTE — PLAN OF CARE
Problem: Behavioral Disturbance  Goal: Behavioral Disturbance  1. Patient will take medication and mood become stable  2. Medication regime established  3. Follow up care in place and transition back to group home   Outcome: No Change  Pt has been restless and irritable in the ITC throughout the shift, but can be redirected successfully with persistence and clarity. Pt was in restraints from 0700 AM to 1030 AM; pt went to the restroom and ate most of his meals for breakfast and lunch. Writer informed Pt that if he is not compliant with no hitting, spitting or scratching of staff then he would be placed back into restraints. Pt responded very well to this and has not acted out as bad as yesterday. Pt is redirectable and can be irritable; often yelling for a little bit but then calming down. Pt bed rested for awhile during the day and ate most of his meals for breakfast and lunch.

## 2017-12-29 NOTE — CODE/RAPID RESPONSE
[facility]    RRT Note:  Restraint check  12/29/2017   Time Called: 0731    RRT called for: Restraint Check  --4/5 pt restraints - 4 extr nylon locking + chest    Assessment & Plan   IMPRESSION & PLAN:    Restraints for Behavior--Danger to Others: Per report, the patient stated he needed to use the bathroom, and as staff assisted him to the bathroom he became increasingly agitated and began attempting to strike, scratch and spit at staff members. The patient did not accept verbal de-escalation or distraction, staff members summoned security to assist placing patient in restraints. There were no patient or staff injuries reported to me.  All restraints applied appropriately w/o complication. Restraints include - 4/5 nylon locking restraints applied - supine position + chest belt.    INTERVENTIONS:  --Face-to-Face eval personally performed.    --q4h renewal - verbal ok but at least q8h Face-to-Face evaluation.   --1:1 sitter at the bedside for patient safety    Defer further care to psychiatrist     Interval History     Hugh Molina is a 67 year old male who was admitted on 12/28/2017 for increasing aggressive behavior, agitation, inappropriate behavior and insomnia.     Medical history significant for: mental retardation, developmental delay, autism, essential hypertension, hyperlipidemia, hypothyroidism and blindness    Code Status: Full Code    FLORECITA Dial CNP  House Officer  Pager: [phone] (4k - 6u)    Allergies   No Known Allergies    Physical Exam   Vital Signs with Ranges:  Temp:  [97.9  F (36.6  C)] 97.9  F (36.6  C)  Pulse:  [84] 84  Resp:  [18] 18  BP: (116)/(67) 116/67  SpO2:  [96 %] 96 %       Constitutional: Lying supine in bed  Skin/Integumen: warm, dry   Neuro: Somnolent  Psych:  Sedated appearing  Extremities: all 4 extr warm, good color, < 1sec cap refill  (all 4 extr straps correct tension - no excessive slack - with west facing outward)  (5 pt restraints: 4 extr +  chest)      Data     IMAGING: (X-ray/CT/MRI)   No results found for this or any previous visit (from the past 24 hour(s)).    CBC with Diff:  Recent Labs   Lab Test  12/28/17   1212   WBC  6.1   HGB  14.8   MCV  93   PLT  211        Comprehensive Metabolic Panel:    Recent Labs  Lab 12/28/17  1212      POTASSIUM 4.0   CHLORIDE 108   CO2 25   ANIONGAP 7   GLC 91   BUN 14   CR 0.77   GFRESTIMATED >90   GFRESTBLACK >90   WENDI 9.4           Time Spent on this Encounter   I spent 15 (5 face time, 10 interpreting results, updating providers and completing documentation) minutes on the unit/floor managing the care of Hugh Molina. Over 50% of my time was spent counseling the patient and/or coordinating care regarding services listed in this note.

## 2017-12-29 NOTE — H&P
DATE OF SERVICE:  12/28/2017      PRIMARY CARE PROVIDER:  Gregory Pool MD      PSYCHIATRIST:  Justice Kwong MD      REQUESTING PHYSICIAN:  Charlie London MD      REASON FOR CONSULTATION:  Psych H&P.      HISTORY OF PRESENT ILLNESS:  Mr. Hugh Molina is a 67-year-old male with past medical history significant for mental retardation, developmental delay, autism, essential hypertension, hyperlipidemia, hypothyroidism and blindness who presents today with increasing aggressive behavior, agitation, inappropriate behavior and insomnia from his group home.  The patient is followed by psychiatrist, Dr. Justice Kwong, as an outpatient who has been titrating his risperidone, but after discussion by the group home with Dr. Kwong, it was determined for patient to present to the emergency department and subsequently be admitted to Psychiatry for further evaluation.      Presently patient is seen in the locked unit of the mental health unit with a nurse at his side.  Review of systems is unable to be retrieved secondary to developmental delay and mental retardation.  Of note, upon examination, patient eating supper without difficulty and currently calm without agitation.      PAST MEDICAL HISTORY:   1.  Hyperlipidemia.   2.  Mental retardation.   3.  Developmental delay.   4.  Essential hypertension.   5.  Autism.   6.  Hypothyroidism.   7.  Blindness.      PAST SURGICAL HISTORY:  No surgical history noted in the EMR.      SOCIAL HISTORY:   1.  Per EMR:  No tobacco, alcohol or illicit drug use.   2.  Currently resides in a group home, guardian Bernadette Fonseca.      FAMILY HISTORY:  No noted family history in the EMR.      ALLERGIES:  No known drug allergies.      MEDICATIONS:    Prior to Admission medications    Medication Sig Last Dose Taking? Auth Provider   LEVOTHYROXINE SODIUM PO Take 50 mcg by mouth daily 12/27/2017 at 0700 Yes Unknown, Entered By History   METOPROLOL SUCCINATE ER PO Take 25 mg by mouth  daily 12/27/2017 at 0730 Yes Unknown, Entered By History   RISPERIDONE PO Take 1 mg by mouth every morning 12/27/2017 at 0700 Yes Unknown, Entered By History   triamcinolone (KENALOG) 0.1 % cream Apply topically 3 times daily as needed for irritation (rash) unknown prn Yes Unknown, Entered By History   Cholecalciferol (VITAMIN D3 PO) Take 2,000 Units by mouth daily  12/27/2017 at 0730 Yes Reported, Patient   chlorhexidine (PERIDEX) 0.12 % solution Take 15 mLs by mouth 2 times daily  12/27/2017 at 0730 Yes Reported, Patient   SIMVASTATIN PO Take 20 mg by mouth daily.   12/26/2017 at 2000 Yes Reported, Patient   dorzolamide-timolol (COSOPT) 2-0.5 % ophthalmic solution Place 1 drop into the right eye 2 times daily. 12/27/2017 at 0730 Yes Reported, Patient   hydrocortisone 1 % cream Apply topically 4 times daily as needed for itching  unknown prn Yes Reported, Patient   DiphenhydrAMINE HCl (BENADRYL PO) Take 50 mg by mouth every 6 hours as needed for itching (and rash)  unknown prn Yes Reported, Patient   RISPERIDONE PO Take 3 mg by mouth At Bedtime  12/26/2017 at 2000  Reported, Patient     REVIEW OF SYSTEMS:  Unable to obtain secondary to patient's mental status.      PHYSICAL EXAMINATION:   VITAL SIGNS:  Reviewed and are as follows:  Temperature 97.9, blood pressure 116/67, heart rate 84, respiratory rate 18, O2 sats 96% on room air.   CONSTITUTIONAL:  The patient is sitting in a chair at the table eating his supper, calm, minimally cooperative, in no apparent distress, disheveled appearing.   HEENT:  Normocephalic, atraumatic.  Oropharynx with moist mucous membranes.  No sores noted.  NECK:  Normal range of motion.  No nuchal rigidity noted.  Supple.   LUNGS:  No increased work of breathing.  Clear to auscultation bilaterally posterior, no crackles or wheezing noted.   CARDIOVASCULAR:  Normal apical pulse, regular rate and rhythm, normal S1 and S2.  No murmur, rub or gallop noted.   ABDOMEN:  Normal bowel sounds,  soft, nondistended, nontender.  No masses palpated.  No guarding, rebound, tenderness noted.   EXTREMITIES:  Moves all 4 extremities.  Dorsalis pedis and radial pulses palpable bilaterally.  Lower extremities with no edema.   NEUROLOGIC:  Awake, alert.  Sensory appears intact.  Incomprehensible speech noted.   SKIN:  Warm and dry.  No lesions or rashes noted.   PSYCHIATRIC:  Currently calm, affect normal.      LABORATORY DATA:  Reviewed in Epic.      ASSESSMENT AND PLAN:  Mr. Hugh Salmon is a 67-year-old male with past medical history significant for mental retardation, developmental delay, hyperlipidemia, essential hypertension with autism, hypothyroidism and blindness who presents today from his group home for increasing aggressive behavior and agitation along with inappropriate behavior and insomnia.  The patient is being admitted to Psychiatry for further evaluation and management.      1.  Aggressive behavior and agitation in the setting of mental retardation, developmental delay and autism.   -- Diagnosis to be managed per psychiatry.   -- The patient on risperidone prior to admission.  Psychiatry has currently resumed.   -- PRN Zyprexa ordered per primary.      2.  Hyperlipidemia.   -- Continue prior to admission simvastatin.      3.  Essential hypertension.   -- Continue prior to admission metoprolol with hold parameters.      4.  Hypothyroidism.   -- Continue prior to admission levothyroxine.   -- TSH on admission 1.36.      Deep vein thrombosis prophylaxis.   -- Encourage ambulation and out of bed daily.      CODE STATUS:  Full code.      DISPOSITION:  Per primary service.      This patient was discussed with Dr. Brenda Leong of the hospitalist service who agrees with the current plans as outlined above.         BRENDA LEONG MD       As dictated by CRISTINA HUANG NP            D: 12/28/2017 20:20   T: 12/28/2017 21:56   MT:       Name:     HUGH SALMON   MRN:      [id number]        Account:       [id number]   :      1950           Admitted:     [id number]      Document: [id number]       cc: Gregory Pool MD

## 2017-12-29 NOTE — PLAN OF CARE
Problem: Behavioral Disturbance  Goal: Behavioral Disturbance  1. Patient will take medication and mood become stable  2. Medication regime established  3. Follow up care in place and transition back to group home   Outcome: Therapy, progress toward functional goals is gradual  Patient had a fairly smooth shift. He spent his time walking with staff and working with his bead. Patient likes to take the arm of staff when walking. He seems to understand verbal communication well: ex. Telling him where his food is, where the bed is, where chairs and tables are, etc. Patient does not like straws in his drinks. Patient is able to dress with prompts. Used the bathroom twice and voided. He brushed his teeth for a very short while.The patient's speech is difficult to understand, but he is able to make some things known. Stated things like: coffee, pop, supper, van ride, go home, good bye, where am I? And hospital. He swats as staff every now and them, this seems to be precursored by being confused or annoyed. Staff needs to firmly state that that behavior is not allowed. Per his group home staff, the patient likes tactile things like running his fingers through beads. And receiving a pop is a good way to de-escalate his agitation. After being in restraints earlier in the shift, patient was much more redirectable.

## 2017-12-30 LAB — VALPROATE SERPL-MCNC: 81 MG/L (ref 50–100)

## 2017-12-30 PROCEDURE — 80164 ASSAY DIPROPYLACETIC ACD TOT: CPT | Performed by: PSYCHIATRY & NEUROLOGY

## 2017-12-30 PROCEDURE — 25000132 ZZH RX MED GY IP 250 OP 250 PS 637: Performed by: PSYCHIATRY & NEUROLOGY

## 2017-12-30 PROCEDURE — 12400006 ZZH R&B MH INTERMEDIATE

## 2017-12-30 PROCEDURE — 40000915 ZZH STATISTIC SITTER, EVENING HOURS

## 2017-12-30 PROCEDURE — 40000914 ZZH STATISTIC SITTER, DAY HOURS

## 2017-12-30 PROCEDURE — 36415 COLL VENOUS BLD VENIPUNCTURE: CPT | Performed by: PSYCHIATRY & NEUROLOGY

## 2017-12-30 PROCEDURE — 25000132 ZZH RX MED GY IP 250 OP 250 PS 637: Performed by: NURSE PRACTITIONER

## 2017-12-30 PROCEDURE — 40000916 ZZH STATISTIC SITTER, NIGHT HOURS

## 2017-12-30 RX ADMIN — DORZOLAMIDE HYDROCHLORIDE AND TIMOLOL MALEATE 1 DROP: 20; 5 SOLUTION/ DROPS OPHTHALMIC at 10:19

## 2017-12-30 RX ADMIN — OLANZAPINE 10 MG: 5 TABLET, ORALLY DISINTEGRATING ORAL at 16:24

## 2017-12-30 RX ADMIN — LEVOTHYROXINE SODIUM 50 MCG: 50 TABLET ORAL at 10:17

## 2017-12-30 RX ADMIN — QUETIAPINE FUMARATE 100 MG: 100 TABLET ORAL at 15:51

## 2017-12-30 RX ADMIN — Medication 15 ML: at 20:02

## 2017-12-30 RX ADMIN — VITAMIN D, TAB 1000IU (100/BT) 2000 UNITS: 25 TAB at 10:18

## 2017-12-30 RX ADMIN — QUETIAPINE FUMARATE 100 MG: 100 TABLET ORAL at 10:17

## 2017-12-30 RX ADMIN — QUETIAPINE FUMARATE 100 MG: 100 TABLET ORAL at 20:01

## 2017-12-30 RX ADMIN — DORZOLAMIDE HYDROCHLORIDE AND TIMOLOL MALEATE 1 DROP: 20; 5 SOLUTION/ DROPS OPHTHALMIC at 15:52

## 2017-12-30 RX ADMIN — SIMVASTATIN 20 MG: 10 TABLET, FILM COATED ORAL at 20:00

## 2017-12-30 RX ADMIN — DIPHENHYDRAMINE HYDROCHLORIDE 50 MG: 25 CAPSULE ORAL at 10:18

## 2017-12-30 RX ADMIN — METOPROLOL SUCCINATE 25 MG: 25 TABLET, EXTENDED RELEASE ORAL at 10:17

## 2017-12-30 RX ADMIN — RISPERIDONE 3 MG: 1 TABLET, ORALLY DISINTEGRATING ORAL at 20:00

## 2017-12-30 RX ADMIN — RISPERIDONE 1 MG: 1 TABLET, ORALLY DISINTEGRATING ORAL at 10:18

## 2017-12-30 RX ADMIN — Medication 15 ML: at 10:00

## 2017-12-30 RX ADMIN — DIVALPROEX SODIUM 1000 MG: 500 TABLET, EXTENDED RELEASE ORAL at 19:59

## 2017-12-30 ASSESSMENT — ACTIVITIES OF DAILY LIVING (ADL)
GROOMING: SHOWER
LAUNDRY: WITH SUPERVISION
GROOMING: WITH ASSISTANCE
DRESS: WITH ASSISTANCE
ORAL_HYGIENE: WITH ASSISTANCE

## 2017-12-30 NOTE — H&P
"[facility] Psychiatric H&P Note       Initial History     The patient's care was discussed with the treatment team and chart notes were reviewed.     Patient examined for psychiatric admission.     IDENTIFICATION    Patient is a 67 year old male. Pt sees PCP Gregory Cali. Psychiatrist Dr Abraham Kwong lives in a group home     HISTORY OF PRESENT ILLNESS  Mr Molina has been in  Group home for many decades. He was maintained on Risperdal for most of the past 2 decades.  He is profoundly cognitively impaired  Blind and  Is directable with staff but very limited communication skills. He will often point to his eyes and repeat \"Eyes, Eyes, Eyes\". Largely he is cooperative with a lot of guidance. He has had one episode where he got extremely agitated at Dr Kwong's office with no provocation an damaged the desk,  He doesn't usually injure people. The past few months he has become more trouble some. He has been pulling his clothes off falling down and hitting staff. Risperdal was increased to 3 mg and later cogentin was added and he improved only slightly. His behaviors escalated and he came to office visit with Dr Kwong at [facility] and he was extremely agitated and non-directable pt was sent to [facility] for admission to [facility] through the ER.  He was extremely upset banging on the walls at the ER ws given Haldol 10mg and [id number] and he settled down but was sill wide awake and transferred to [facility] [address] [facility],      CHEMICAL DEPENDENCY HISTORY  NA      PAST  PSYCHIATRIC HISTORY  Pt has been on multiple medications for many decades. From archived records pt had been on Thorazine, Mellarill, Valium, Librium, Lithium, serentil,Tranxene, Elavil, Risperdal was started many years ago      FAMILY HISTORY  unknown      SOCIAL HISTORY    Institutionalized all his life, Guardian Bernadette at [phone]     Hospital Course     Pt started on Depakote 500mg, Continued on " Risperdal 1mg am 3 mg hs, prns of Zyprexa.     Medications     Prescriptions Prior to Admission   Medication Sig Dispense Refill Last Dose     LEVOTHYROXINE SODIUM PO Take 50 mcg by mouth daily   12/27/2017 at 0700     METOPROLOL SUCCINATE ER PO Take 25 mg by mouth daily   12/27/2017 at 0730     RISPERIDONE PO Take 1 mg by mouth every morning   12/27/2017 at 0700     triamcinolone (KENALOG) 0.1 % cream Apply topically 3 times daily as needed for irritation (rash)   unknown prn     Cholecalciferol (VITAMIN D3 PO) Take 2,000 Units by mouth daily    12/27/2017 at 0730     chlorhexidine (PERIDEX) 0.12 % solution Take 15 mLs by mouth 2 times daily    12/27/2017 at 0730     SIMVASTATIN PO Take 20 mg by mouth daily.     12/26/2017 at 2000     dorzolamide-timolol (COSOPT) 2-0.5 % ophthalmic solution Place 1 drop into the right eye 2 times daily.   12/27/2017 at 0730     hydrocortisone 1 % cream Apply topically 4 times daily as needed for itching    unknown prn     DiphenhydrAMINE HCl (BENADRYL PO) Take 50 mg by mouth every 6 hours as needed for itching (and rash)    unknown prn     RISPERIDONE PO Take 3 mg by mouth At Bedtime    12/26/2017 at 2000       Scheduled Medications:    divalproex  1,000 mg Oral QPM     QUEtiapine  100 mg Oral TID     chlorhexidine  15 mL Mouth/Throat BID     cholecalciferol (vitamin D3) tablet 2,000 Units  2,000 Units Oral Daily     dorzolamide-timolol  1 drop Right Eye BID     levothyroxine (SYNTHROID/LEVOTHROID) tablet 50 mcg  50 mcg Oral Daily     metoprolol (TOPROL-XL) 24 hr tablet 25 mg  25 mg Oral Daily     risperiDONE (risperDAL M-TABS) ODT tab 1 mg  1 mg Oral QAM     simvastatin (ZOCOR) tablet 20 mg  20 mg Oral At Bedtime     Risperidone  3 mg Sublingual At Bedtime     PRNs:  hydrOXYzine, acetaminophen, diphenhydrAMINE (BENADRYL) capsule 50 mg, hydrocortisone, triamcinolone, OLANZapine zydis, OLANZapine      Allergies      No Known Allergies     Previous Medical History     Past Medical  History:   Diagnosis Date     Autistic disorder      Blind      MR (mental retardation)         Medical Review of Systems     /77  Pulse 85  Temp 97.9  F (36.6  C) (Temporal)  Resp 17  SpO2 96%  There is no height or weight on file to calculate BMI.    Previous 10-point ROS not able to do 2nd to pt's severe limitations      Mental Status Examination     Appearance Sitting in chair, dressed in scrubs. Appears stated age.   Attitude Un-cooperative   Orientation Oriented to person possilbly   Eye Contact Pt is blind   Speech Regular rate, rhythm, volume and tone   Language Normal   Psychomotor Behavior Normal   Mood agitated   Affect Irritable and striking out   Thought Process Goal-Oriented, Intact   Associations Intact   Thought Content [facility] Knowledge impaired   Insight Imp[aired   Judgement impaired   Attention Span & Concentration poor   Recent & Remote Memory Extremely impaired   Gait unsteady   Muscle Tone Intact      Labs     Labs reviewed.  Recent Results (from the past 24 hour(s))   UA with Microscopic reflex to Culture    Collection Time: 12/29/17  8:00 PM   Result Value Ref Range    Color Urine Yellow     Appearance Urine Slightly Cloudy     Glucose Urine Negative NEG^Negative mg/dL    Bilirubin Urine Negative NEG^Negative    Ketones Urine 10 (A) NEG^Negative mg/dL    Specific Gravity Urine 1.020 1.003 - 1.035    Blood Urine Negative NEG^Negative    pH Urine 6.0 5.0 - 7.0 pH    Protein Albumin Urine 10 (A) NEG^Negative mg/dL    Urobilinogen mg/dL 2.0 0.0 - 2.0 mg/dL    Nitrite Urine Negative NEG^Negative    Leukocyte Esterase Urine Small (A) NEG^Negative    Source Midstream Urine     WBC Urine 3 (H) 0 - 2 /HPF    RBC Urine 2 0 - 2 /HPF    Squamous Epithelial /HPF Urine <1 0 - 1 /HPF    Mucous Urine Present (A) NEG^Negative /LPF          Impression     This is a 67 year old male with Autism Spectrum, Cognitive Impairment formerly listed as MR,        Diagnoses     1. Autism Spectrum    2. Intellectual Disability formerly known as  MR  3. Likely Dementia   4. Blind     Plan     1. Explained side effects, benefits, and complications of medications to the patient, Pt gave verbal consent.  2. Medication changes: add Depakote,   3. Discussed treatment plan with patient and team.  4. Projected length of stay: 2 weeks        Attestation:   Patient has been seen and evaluated by me, Justice Kwong MD.    Patient ID:  Name: Hugh Molina    MRN: [id number]  Admission: 12/28/2017   YOB: 1950

## 2017-12-30 NOTE — PROGRESS NOTES
On 12/29 around 2330 when assisting pt to the toilet he slid off the toilet and into the wall. Pt subsequently hit his head on the right side and then went to his hands and knees.  Noticeable redness on forehead and redness on R wrist, skin remains intact. Writer standing by pt when incident occurred. VS were taken: /77, P 85.  RN notified and RRT was paged.  Pt continuing to hold his head off and on as if in pain, difficult to ascertain verbally if he is due to his underlying MR/DD.  PRN acetaminophen was offered and pt declined.  1:1 staff by bedside. Post fall huddle completed. Will continue to monitor

## 2017-12-30 NOTE — PROGRESS NOTES
[facility] Psychiatric H&P Note       Psychiaric Progress note     The patient's care was discussed with the treatment team and chart notes were reviewed.     Patient examined for psychiatric admission.         HISTORY OF PRESENT ILLNESS  Pt has been started on Depakote 1000mg and continued on Risperdal 3mg hs 1mg am.  He has had prns of Zyprexa 10mg IM and he was given Benadryl. Pt still agitated but more directable has been sleeping more and not hitting staff. Does grab on to staff but does not attempt to hurt them.      Hospital Course     Pt started on Depakote 500mg, Continued on Risperdal 1mg am 3 mg hs, prns of Zyprexa.     Medications     Prescriptions Prior to Admission   Medication Sig Dispense Refill Last Dose     LEVOTHYROXINE SODIUM PO Take 50 mcg by mouth daily   12/27/2017 at 0700     METOPROLOL SUCCINATE ER PO Take 25 mg by mouth daily   12/27/2017 at 0730     RISPERIDONE PO Take 1 mg by mouth every morning   12/27/2017 at 0700     triamcinolone (KENALOG) 0.1 % cream Apply topically 3 times daily as needed for irritation (rash)   unknown prn     Cholecalciferol (VITAMIN D3 PO) Take 2,000 Units by mouth daily    12/27/2017 at 0730     chlorhexidine (PERIDEX) 0.12 % solution Take 15 mLs by mouth 2 times daily    12/27/2017 at 0730     SIMVASTATIN PO Take 20 mg by mouth daily.     12/26/2017 at 2000     dorzolamide-timolol (COSOPT) 2-0.5 % ophthalmic solution Place 1 drop into the right eye 2 times daily.   12/27/2017 at 0730     hydrocortisone 1 % cream Apply topically 4 times daily as needed for itching    unknown prn     DiphenhydrAMINE HCl (BENADRYL PO) Take 50 mg by mouth every 6 hours as needed for itching (and rash)    unknown prn     RISPERIDONE PO Take 3 mg by mouth At Bedtime    12/26/2017 at 2000       Scheduled Medications:    divalproex  1,000 mg Oral QPM     QUEtiapine  100 mg Oral TID     chlorhexidine  15 mL Mouth/Throat BID     cholecalciferol (vitamin D3) tablet 2,000  Units  2,000 Units Oral Daily     dorzolamide-timolol  1 drop Right Eye BID     levothyroxine (SYNTHROID/LEVOTHROID) tablet 50 mcg  50 mcg Oral Daily     metoprolol (TOPROL-XL) 24 hr tablet 25 mg  25 mg Oral Daily     risperiDONE (risperDAL M-TABS) ODT tab 1 mg  1 mg Oral QAM     simvastatin (ZOCOR) tablet 20 mg  20 mg Oral At Bedtime     Risperidone  3 mg Sublingual At Bedtime     PRNs:  hydrOXYzine, acetaminophen, diphenhydrAMINE (BENADRYL) capsule 50 mg, hydrocortisone, triamcinolone, OLANZapine zydis, OLANZapine      Allergies      No Known Allergies     Previous Medical History     Past Medical History:   Diagnosis Date     Autistic disorder      Blind      MR (mental retardation)         Medical Review of Systems     /77  Pulse 85  Temp 97.9  F (36.6  C) (Temporal)  Resp 17  SpO2 96%  There is no height or weight on file to calculate BMI.    Previous 10-point ROS not able to do 2nd to pt's severe limitations      Mental Status Examination     Appearance Sitting in chair, dressed in scrubs. Appears stated age.   Attitude Un-cooperative   Orientation Oriented to person possilbly   Eye Contact Pt is blind   Speech Regular rate, rhythm, volume and tone   Language Normal   Psychomotor Behavior Normal   Mood agitated   Affect Irritable and striking out   Thought Process Goal-Oriented, Intact   Associations Intact   Thought Content [facility] impaired   Insight Imp[aired   Judgement impaired   Attention Span & Concentration poor   Recent & Remote Memory Extremely impaired   Gait unsteady   Muscle Tone Intact      Labs     Labs reviewed.  Recent Results (from the past 24 hour(s))   UA with Microscopic reflex to Culture    Collection Time: 12/29/17  8:00 PM   Result Value Ref Range    Color Urine Yellow     Appearance Urine Slightly Cloudy     Glucose Urine Negative NEG^Negative mg/dL    Bilirubin Urine Negative NEG^Negative    Ketones Urine 10 (A) NEG^Negative mg/dL    Specific Gravity  Urine 1.020 1.003 - 1.035    Blood Urine Negative NEG^Negative    pH Urine 6.0 5.0 - 7.0 pH    Protein Albumin Urine 10 (A) NEG^Negative mg/dL    Urobilinogen mg/dL 2.0 0.0 - 2.0 mg/dL    Nitrite Urine Negative NEG^Negative    Leukocyte Esterase Urine Small (A) NEG^Negative    Source Midstream Urine     WBC Urine 3 (H) 0 - 2 /HPF    RBC Urine 2 0 - 2 /HPF    Squamous Epithelial /HPF Urine <1 0 - 1 /HPF    Mucous Urine Present (A) NEG^Negative /LPF          Impression     This is a 67 year old male with Autism Spectrum, Cognitive Impairment formerly listed as MR, Will start Seroquel 100mg tid ad eventually lower Risperal and continue Depakote level will check level in am       Diagnoses     1. Autism Spectrum   2. Intellectual Disability formerly known as  MR  3. Likely Dementia   4. Blind     Plan     1. Explained side effects, benefits, and complications of medications to the patient, Pt gave verbal consent.  2. Medication changes: add Depakote,   3. Discussed treatment plan with patient and team.  4. Projected length of stay: 2 weeks        Attestation:   Patient has been seen and evaluated by me, Justice Kwong MD.    Patient ID:  Name: Hugh Molina    MRN: [id number]  Admission: 12/28/2017   YOB: 1950

## 2017-12-30 NOTE — PLAN OF CARE
Problem: General Plan of Care (Inpatient Behavioral)  Goal: Individualization/Patient Specific Goal (IP Behavioral)  1. Patient will take medication as ordered  2. Limits set on behavior and staff will be consistent  3. Maintain safe and secure environment due to patient's blindness     Outcome: Improving  Less agitation, still needs guidance with activities of daily care.   He was helped in the shower and brushing his teeth.   Complaint with medications, fed himself with some direction.  Spend the majority of time in lounge, he was quiet and more redirectable.     Sitter with patient.

## 2017-12-30 NOTE — PLAN OF CARE
Problem: Patient Care Overview  Goal: Plan of Care/Patient Progress Review  Outcome: No Change  Agitated and restless between naps. Occasionally hits staff members and observed hitting him own head. PRN IM Zyprexa given x1. Speech mostly incoherent. Confused. Unsteady gate, up with assist of 1-2. Spent brief period of time in [employer] playing with beads. Voiding small, frequent amounts. Frequently pulling at penis, UA collected, hospitalist read results and no immediate concerns, urine culture pending. Good appetite. Drinking adequately.

## 2017-12-30 NOTE — PROGRESS NOTES
On 12/28, around 11:30pm, pt was sitting on the toilet, he slipped off and he hit his head on the bathroom wall then he fell onto his hands and knees. The Hospitalist was called. Pt was assisted back to bed and VS were taken.  Will continue to monitor.

## 2017-12-30 NOTE — CODE/RAPID RESPONSE
"[facility]    RRT Note  12/29/2017   Time Called: 2330    RRT called for: Witnessed fall    Assessment & Plan   IMPRESSION & PLAN:  Witnessed fall without injury:  Upon arrival, pt sitting on bed with staff at bedside.  Per report, pt was sitting on toilet, slipped off, and \"lightly\" hit right side of head against wall, and subsequently fell onto hands and knees without hitting head additionally.  Per staff and recent interaction with pt, pt appears to be at baseline functioning.  Pt has underlying MR/DD and blindness and difficult to elicit full ROS.    INTERVENTIONS:  - Maintain staff at pt's side to ensure pt safely  - Continue to monitor for any acute mental status changes   - Will defer imaging at this time as pt not on anticoagulation, appears at baseline, and no focal neural deficits noted.    Discussed with and defer further cares to nursing    Interval History     Mr. Hugh Molina is a 67-year-old male with past medical history significant for mental retardation, developmental delay, hyperlipidemia, essential hypertension with autism, hypothyroidism and blindness who presents today from his group home for increasing aggressive behavior and agitation along with inappropriate behavior and insomnia.     Code Status: Full Code    Geoff Mercer, APRN, CNP  House officer    Allergies   No Known Allergies    Physical Exam   Vital Signs with Ranges:  Pulse:  [73-85] 85  Resp:  [17] 17  BP: (115-134)/(48-77) 115/77     Constitutional: Pt sitting on edge of bed, vocal  Pulmonary: In no apparent distress  Cardiovascular: Appears well perfused  Skin/Integumen: Warm and dry.  Erythema noted on R lateral forehead near hairline approximately \"quarter\" size, on R posterior wrist approximately 2\" long by 0.5\" wide, no open areas noted.  Neuro: Difficult to illicit with baseline MR/DD  Extremities: Moves all 4 extremities without difficulty, no bony abnormalities noted on R wrist near erythema " region    Time Spent on this Encounter   I spent 10 minutes on the unit/floor managing the care of Hugh Molina. Over 50% of my time was spent counseling the patient and/or coordinating care regarding services listed in this note.

## 2017-12-31 LAB
BACTERIA SPEC CULT: NORMAL
SPECIMEN SOURCE: NORMAL

## 2017-12-31 PROCEDURE — 40000914 ZZH STATISTIC SITTER, DAY HOURS

## 2017-12-31 PROCEDURE — 25000132 ZZH RX MED GY IP 250 OP 250 PS 637: Performed by: PSYCHIATRY & NEUROLOGY

## 2017-12-31 PROCEDURE — 40000915 ZZH STATISTIC SITTER, EVENING HOURS

## 2017-12-31 PROCEDURE — 25000132 ZZH RX MED GY IP 250 OP 250 PS 637: Performed by: NURSE PRACTITIONER

## 2017-12-31 PROCEDURE — 12400006 ZZH R&B MH INTERMEDIATE

## 2017-12-31 RX ADMIN — RISPERIDONE 1 MG: 1 TABLET, ORALLY DISINTEGRATING ORAL at 09:54

## 2017-12-31 RX ADMIN — DIPHENHYDRAMINE HYDROCHLORIDE 25 MG: 25 CAPSULE ORAL at 10:04

## 2017-12-31 RX ADMIN — VITAMIN D, TAB 1000IU (100/BT) 2000 UNITS: 25 TAB at 09:52

## 2017-12-31 RX ADMIN — LEVOTHYROXINE SODIUM 50 MCG: 50 TABLET ORAL at 09:53

## 2017-12-31 RX ADMIN — OLANZAPINE 10 MG: 5 TABLET, ORALLY DISINTEGRATING ORAL at 13:38

## 2017-12-31 RX ADMIN — QUETIAPINE FUMARATE 100 MG: 100 TABLET ORAL at 09:52

## 2017-12-31 RX ADMIN — HYDROXYZINE HYDROCHLORIDE 50 MG: 25 TABLET ORAL at 18:59

## 2017-12-31 RX ADMIN — QUETIAPINE FUMARATE 100 MG: 100 TABLET ORAL at 20:21

## 2017-12-31 RX ADMIN — DORZOLAMIDE HYDROCHLORIDE AND TIMOLOL MALEATE 1 DROP: 20; 5 SOLUTION/ DROPS OPHTHALMIC at 19:14

## 2017-12-31 RX ADMIN — ACETAMINOPHEN 650 MG: 325 TABLET, FILM COATED ORAL at 18:59

## 2017-12-31 RX ADMIN — Medication 15 ML: at 20:31

## 2017-12-31 RX ADMIN — METOPROLOL SUCCINATE 25 MG: 25 TABLET, EXTENDED RELEASE ORAL at 09:51

## 2017-12-31 RX ADMIN — DIPHENHYDRAMINE HYDROCHLORIDE 50 MG: 25 CAPSULE ORAL at 19:02

## 2017-12-31 RX ADMIN — RISPERIDONE 3 MG: 1 TABLET, ORALLY DISINTEGRATING ORAL at 20:21

## 2017-12-31 RX ADMIN — DIVALPROEX SODIUM 1000 MG: 500 TABLET, EXTENDED RELEASE ORAL at 19:09

## 2017-12-31 RX ADMIN — SIMVASTATIN 20 MG: 10 TABLET, FILM COATED ORAL at 20:21

## 2017-12-31 RX ADMIN — Medication 15 ML: at 09:53

## 2017-12-31 RX ADMIN — DORZOLAMIDE HYDROCHLORIDE AND TIMOLOL MALEATE 1 DROP: 20; 5 SOLUTION/ DROPS OPHTHALMIC at 09:53

## 2017-12-31 RX ADMIN — QUETIAPINE FUMARATE 100 MG: 100 TABLET ORAL at 16:16

## 2017-12-31 RX ADMIN — OLANZAPINE 10 MG: 5 TABLET, ORALLY DISINTEGRATING ORAL at 22:30

## 2017-12-31 ASSESSMENT — ACTIVITIES OF DAILY LIVING (ADL)
GROOMING: WITH ASSISTANCE
DRESS: WITH ASSISTANCE
LAUNDRY: WITH SUPERVISION
ORAL_HYGIENE: WITH ASSISTANCE

## 2017-12-31 NOTE — PLAN OF CARE
"Problem: Behavioral Disturbance  Goal: Behavioral Disturbance  1. Patient will take medication and mood become stable  2. Medication regime established  3. Follow up care in place and transition back to group home   Outcome: Therapy, progress toward functional goals is gradual  Patient was present on the unit. Napped on an off for a total for about 2 hours. Patient spent much of his time working with his beads he also enjoyed touching the connect 4 chips. He had a few snacks. Patient had a blood draw. It was done in his hand. The patient did very well will encouragement and assurance from multiple staff. Walked the lounge occasionally. Patient was generally redirectable this evening. Only sparingly swatted at staff. Of note, patient stated \"bathroom\" many times. He used the toilet about 5 occasions, voiding a little each time. Patient was very resistant to take his medications this evening. Spit it out and threw it. He eventually took it with applesauce. Writer also noticed some scratches on his wrist.       "

## 2017-12-31 NOTE — PLAN OF CARE
Problem: Behavioral Disturbance  Goal: Behavioral Disturbance  1. Patient will take medication and mood become stable  2. Medication regime established  3. Follow up care in place and transition back to group home   Outcome: No Change  Pt has been bed resting for most of the shift today, but has come out to go to the restroom, eat and clean up. Pt is is still impulsive with irritable and agitated behavior, but has been better altogether the past two days. Pt ate most of his breakfast and lunch. Pt needs help walking around the unit.

## 2018-01-01 PROCEDURE — 25000128 H RX IP 250 OP 636

## 2018-01-01 PROCEDURE — 40000915 ZZH STATISTIC SITTER, EVENING HOURS

## 2018-01-01 PROCEDURE — 12400006 ZZH R&B MH INTERMEDIATE

## 2018-01-01 PROCEDURE — 25000132 ZZH RX MED GY IP 250 OP 250 PS 637: Performed by: NURSE PRACTITIONER

## 2018-01-01 PROCEDURE — 27210995 ZZH RX 272

## 2018-01-01 PROCEDURE — 40000916 ZZH STATISTIC SITTER, NIGHT HOURS

## 2018-01-01 PROCEDURE — 25000132 ZZH RX MED GY IP 250 OP 250 PS 637: Performed by: PSYCHIATRY & NEUROLOGY

## 2018-01-01 PROCEDURE — 25000125 ZZHC RX 250: Performed by: PSYCHIATRY & NEUROLOGY

## 2018-01-01 RX ORDER — LORAZEPAM 2 MG/ML
2 INJECTION INTRAMUSCULAR EVERY 6 HOURS PRN
Status: DISCONTINUED | OUTPATIENT
Start: 2018-01-01 | End: 2018-01-10 | Stop reason: HOSPADM

## 2018-01-01 RX ORDER — HALOPERIDOL 5 MG/ML
5 INJECTION INTRAMUSCULAR EVERY 6 HOURS PRN
Status: DISCONTINUED | OUTPATIENT
Start: 2018-01-01 | End: 2018-01-10 | Stop reason: HOSPADM

## 2018-01-01 RX ORDER — LORAZEPAM 2 MG/ML
INJECTION INTRAMUSCULAR
Status: COMPLETED
Start: 2018-01-01 | End: 2018-01-01

## 2018-01-01 RX ORDER — WATER 10 ML/10ML
INJECTION INTRAMUSCULAR; INTRAVENOUS; SUBCUTANEOUS
Status: COMPLETED
Start: 2018-01-01 | End: 2018-01-01

## 2018-01-01 RX ADMIN — LORAZEPAM 2 MG: 2 INJECTION INTRAMUSCULAR at 11:14

## 2018-01-01 RX ADMIN — DORZOLAMIDE HYDROCHLORIDE AND TIMOLOL MALEATE 1 DROP: 20; 5 SOLUTION/ DROPS OPHTHALMIC at 07:50

## 2018-01-01 RX ADMIN — RISPERIDONE 1 MG: 1 TABLET, ORALLY DISINTEGRATING ORAL at 07:48

## 2018-01-01 RX ADMIN — METOPROLOL SUCCINATE 25 MG: 25 TABLET, EXTENDED RELEASE ORAL at 07:49

## 2018-01-01 RX ADMIN — OLANZAPINE 10 MG: 10 INJECTION, POWDER, FOR SOLUTION INTRAMUSCULAR at 04:20

## 2018-01-01 RX ADMIN — QUETIAPINE FUMARATE 100 MG: 100 TABLET ORAL at 07:50

## 2018-01-01 RX ADMIN — OLANZAPINE 10 MG: 10 INJECTION, POWDER, FOR SOLUTION INTRAMUSCULAR at 11:15

## 2018-01-01 RX ADMIN — DORZOLAMIDE HYDROCHLORIDE AND TIMOLOL MALEATE 1 DROP: 20; 5 SOLUTION/ DROPS OPHTHALMIC at 16:25

## 2018-01-01 RX ADMIN — HYDROXYZINE HYDROCHLORIDE 50 MG: 25 TABLET ORAL at 00:39

## 2018-01-01 RX ADMIN — LEVOTHYROXINE SODIUM 50 MCG: 50 TABLET ORAL at 07:48

## 2018-01-01 RX ADMIN — WATER 10 ML: 1 INJECTION INTRAMUSCULAR; INTRAVENOUS; SUBCUTANEOUS at 11:16

## 2018-01-01 RX ADMIN — Medication 15 ML: at 07:47

## 2018-01-01 RX ADMIN — DIPHENHYDRAMINE HYDROCHLORIDE 50 MG: 25 CAPSULE ORAL at 07:50

## 2018-01-01 RX ADMIN — VITAMIN D, TAB 1000IU (100/BT) 2000 UNITS: 25 TAB at 07:49

## 2018-01-01 NOTE — PROGRESS NOTES
Pt agitated and restless, screaming out, hitting staff. Several interventions were attempted to deescalate patient, such as walking, talking, eating etc. Pt continued to yell and hit, laying down on the floor and hitting the walls.  IM Ativan and IM olanzapine were given. Pt resting at this time. Frequent monitoring was done, per MDs request. Pt vital signs have been WNL.

## 2018-01-01 NOTE — PROGRESS NOTES
At 0145 pt was found on the floor, laying on his left side. A thud was not heard, so it is uncertain if pt fell.  Pt was assisted back to bed.  VS were BP-130/60, P-75, R-18.  Nurse did a thorough skin inspection of his entire body, including his head. No redness or abrasions were found.  The House Physician was called and with the uncertainty if it was a fall and with a entire body skin inspection, Dr. Arora suggested to watch pt for any changes.  Will continue to monitor.

## 2018-01-01 NOTE — PROGRESS NOTES
[facility] Psychiatric Progress Note      Interval History:   Pt seen, team meeting held nursing staff. Patient evaluated. Hugh has been highly agitated today. He is unable to be verbally redirected. He is requiring three antipsychotics without sufficient relief of symptoms. He was down on the ground and restraints were considered. Ativan IM was added with intention to use by itself and communication was not clear enough with  Nursing staff not to combine with IM olanazapine, and thus unfortuantely they were given together.     Patient was unable to participate in psychiatric interview.      Review of systems:   Unaable to be obtained on account of patient's mental status      Medications:       divalproex  1,000 mg Oral QPM     QUEtiapine  100 mg Oral TID     chlorhexidine  15 mL Mouth/Throat BID     cholecalciferol (vitamin D3) tablet 2,000 Units  2,000 Units Oral Daily     dorzolamide-timolol  1 drop Right Eye BID     levothyroxine (SYNTHROID/LEVOTHROID) tablet 50 mcg  50 mcg Oral Daily     metoprolol (TOPROL-XL) 24 hr tablet 25 mg  25 mg Oral Daily     risperiDONE (risperDAL M-TABS) ODT tab 1 mg  1 mg Oral QAM     simvastatin (ZOCOR) tablet 20 mg  20 mg Oral At Bedtime     Risperidone  3 mg Sublingual At Bedtime     LORazepam, hydrOXYzine, acetaminophen, diphenhydrAMINE (BENADRYL) capsule 50 mg, hydrocortisone, triamcinolone, OLANZapine zydis, OLANZapine    Mental Status Examination:     Appearance:  awake, alert  Eye Contact:  poor   Speech:  dysarthria and decreased prosody  Language:Normal  Psychomotor Behavior:  no evidence of tardive dyskinesia, dystonia, or tics  Mood:  angry  Affect:  mood congruent  Thought Process:  disorganized paucity of spontaneous thought  Thought Content:  no evidence of suicidal ideation or homicidal ideation  Oriented to:  Unable to assess  Attention Span and Concentration:  limited  Recent and Remote Memory:  limited  Fund of Knowledge: delayed  Muscle  Strength and Tone: normal on visual inspection, too agitated to examine at this time  Gait and Station: Normal  Insight:  limited  Judgment:  limited        Labs/Vitals:   No results found for this or any previous visit (from the past 24 hour(s)).  B/P: 129/82, T: 97.8, P: 81, R: 16    Impression:   Hugh remains highly agitated at times despite three atypical antipsychotics.       DIagnoses:   1. Autism Spectrum   2. Intellectual Disability formerly known as  MR  3. Likely Dementia   4. Blind             Plan:   1. Lorazepam 2 mg q 6 hr PO/IM added for severe agitation. From now on, we need to ensure this is not given along with IM olanzapine. Will switch IM to haldol 5 mg as this is not associated with the same cardiorespiratory risk  2 Will monitor patient's 02 with continuous pulse ox for next several hours given medication admisistration above. Will check blood pressure/pulse every 15 minutes for next two hours      Jeferson Bernard MD      Attestation:  Patient has been seen and evaluated by me,  Jeferson Bernard MD

## 2018-01-01 NOTE — PROGRESS NOTES
At 1530 Pt had coarse upper respiratory sounds and still quite sedated. 02 sat 89%  Patient sat up in a recliner and was suctioned for some secretions in his mouth and 02 sats went up to 92%. Pt more alert and is sitting in the recliner in the lounge at 1600. Will continue to monitor. Will notify MD

## 2018-01-01 NOTE — PROGRESS NOTES
Pt was bed resting at the beginning of the shift. Pt appeared sedated and to have difficulty breathing. O2 was 89, RR 18, gurgling lung sounds.  Attempted to elevate HOB; however bed was not functioning properly.  Pt was sat up in bed with staff assist X 2.  Pt was placed in a recliner.  Excessive mucous in his mouth and was suctioned.  O2 increased to 91. Mouth swabs were used to clean oral cavity.  Encouraged coughing. Pt's depends were soiled, he was cleaned and given clean clothing. O2 increased to 92.  1600 Seroquel was held given his sedation.  Will cont to monitor and notify MD.

## 2018-01-01 NOTE — PLAN OF CARE
Problem: Behavioral Disturbance  Goal: Behavioral Disturbance  1. Patient will take medication and mood become stable  2. Medication regime established  3. Follow up care in place and transition back to group home   Pt sleeping earlier in the shift then after supper he became very agitated and impulsive, very hard to redirect;  but was able to take PRN Vistaril & Benadryl with little relief. Pt stayed in the lounge for awhile, occasionally would yell out and getting up to go to BR. Pt went back to bed at 2150.

## 2018-01-02 PROCEDURE — 40000916 ZZH STATISTIC SITTER, NIGHT HOURS

## 2018-01-02 PROCEDURE — 12400006 ZZH R&B MH INTERMEDIATE

## 2018-01-02 PROCEDURE — 40000915 ZZH STATISTIC SITTER, EVENING HOURS

## 2018-01-02 PROCEDURE — 25000132 ZZH RX MED GY IP 250 OP 250 PS 637: Performed by: NURSE PRACTITIONER

## 2018-01-02 PROCEDURE — 99207 ZZC NON BILLABLE SERVICE FOR FALL: CPT | Performed by: NURSE PRACTITIONER

## 2018-01-02 PROCEDURE — 25000132 ZZH RX MED GY IP 250 OP 250 PS 637: Performed by: PSYCHIATRY & NEUROLOGY

## 2018-01-02 RX ADMIN — Medication 15 ML: at 08:05

## 2018-01-02 RX ADMIN — DIVALPROEX SODIUM 1000 MG: 500 TABLET, EXTENDED RELEASE ORAL at 21:29

## 2018-01-02 RX ADMIN — QUETIAPINE FUMARATE 100 MG: 100 TABLET ORAL at 21:04

## 2018-01-02 RX ADMIN — QUETIAPINE FUMARATE 100 MG: 100 TABLET ORAL at 08:04

## 2018-01-02 RX ADMIN — RISPERIDONE 3 MG: 1 TABLET, ORALLY DISINTEGRATING ORAL at 21:04

## 2018-01-02 RX ADMIN — SIMVASTATIN 20 MG: 10 TABLET, FILM COATED ORAL at 21:04

## 2018-01-02 RX ADMIN — Medication 15 ML: at 21:29

## 2018-01-02 RX ADMIN — LEVOTHYROXINE SODIUM 50 MCG: 50 TABLET ORAL at 08:04

## 2018-01-02 RX ADMIN — VITAMIN D, TAB 1000IU (100/BT) 2000 UNITS: 25 TAB at 08:04

## 2018-01-02 RX ADMIN — QUETIAPINE FUMARATE 100 MG: 100 TABLET ORAL at 17:05

## 2018-01-02 RX ADMIN — DORZOLAMIDE HYDROCHLORIDE AND TIMOLOL MALEATE 1 DROP: 20; 5 SOLUTION/ DROPS OPHTHALMIC at 17:06

## 2018-01-02 RX ADMIN — DORZOLAMIDE HYDROCHLORIDE AND TIMOLOL MALEATE 1 DROP: 20; 5 SOLUTION/ DROPS OPHTHALMIC at 08:09

## 2018-01-02 RX ADMIN — RISPERIDONE 1 MG: 1 TABLET, ORALLY DISINTEGRATING ORAL at 08:04

## 2018-01-02 RX ADMIN — METOPROLOL SUCCINATE 25 MG: 25 TABLET, EXTENDED RELEASE ORAL at 08:04

## 2018-01-02 ASSESSMENT — ACTIVITIES OF DAILY LIVING (ADL)
GROOMING: WITH ASSISTANCE
DRESS: SCRUBS (BEHAVIORAL HEALTH)
LAUNDRY: WITH SUPERVISION
ORAL_HYGIENE: WITH ASSISTANCE

## 2018-01-02 NOTE — PROGRESS NOTES
Pt was able to clear secretions with coughing and a drink of water. Pt was able to swallow the water without difficulty. Pt continues in recliner to keep head elevated.  Pt had small continent BM. Needed assist of 2 to transfer to [facility]. Pt is more alert. O2 sat is 93% RA

## 2018-01-02 NOTE — PROGRESS NOTES
hospitalist cross-cover: paged regarding urine culture results to follow-up. UCx with 10-50k mixed urogenital patsy, indicating contaminant. No evidence for UTI or need to treat at this time. Should patient have symptoms, recommend repeating sample.    Luis Tejeda PA-C  1/1/2018, 6:38 PM  Pager: [phone]

## 2018-01-02 NOTE — PLAN OF CARE
Problem: Behavioral Disturbance  Goal: Behavioral Disturbance  1. Patient will take medication and mood become stable  2. Medication regime established  3. Follow up care in place and transition back to group home   Outcome: Declining  Pt has been resting in the recliner almost the entire shift.  Attempted to give pt one bite of soft food and he was unable to swallow it.  Later given 1 ice chip under the tongue but was not observed to attempt to swallow. Will need to monitor for adequate nutrition and hydration.  Mouth was swabbed clean.  Remains sedated.  Pt was incontinent of stool again.  Cleaned and given new scrubs and depends.  Needed assist X 2 to stand and again was only able to bear minimum weight.  HS medications held due to sedation and concerns of pt's ability to swallow.  Spoke with on call psychiatrist regarding holding meds.  Psychiatrist wanted pt to been seen by a physician.  Spoke with house MD regarding pt's condition and he will come see him when available.  Pt's O2 was 92 at 2200.  Continues to be prone to excessive saliva when laying back.  Kept him in the recliner since it can keep his head elevated.  Remains on a 1:1.

## 2018-01-02 NOTE — SIGNIFICANT EVENT
1919 - Mon 1 Jan 2018   724     # ?difficulty with oral secretions after IM lorazepam + olanzapine and appearing sedated -     -- no difficulty managing oral secretions   -- no respiratory distress     --> oral suctioning prn per RT   --> observe on 77       A Lucille CORMIER / 15 min   [facility] / [phone] (p)   ____________________________________      Hx (RN) -   - full code 67yM (nka) - develop delay - MR - blind - autism     ~1500 -   - IM Zyprexa 10   - IM lorazepam 2   - initially sedated + coarse rhonchorous sounds from mouth    - subsequently suctioned oral secretions -> improved       1919 -   - further improved but again coarse rhonchorous sounds from mouth   - no resp difficulty   - ?poss benefit from oral suctioning for comfort     - 95% - RA - 18 - non-labored - not-shallow       2330  On arrival -   - looks ok - nad - sitting up in chair   - lungs clear ausc except slight rhonchi bilat equal post   - no audible respiration sounds from mouth   - afebrile - 97.8 ax                                                       APPENDICES -   - Key to symbols, abbreviations, and format conventions   - Keystrokes for convenience         KEY TO SYMBOLS, ABBREVIATIONS, AND FORMAT CONVENTIONS - [ordered by sense / meaning]     Symbol (Description)  Meaning / interpretation     #  (number/ hashtag)   - problem - summary which does not need editing if copied forward     -  (dash)   - data - s/sx (changes) found/observed at time pt seen     --  (double dash/ hyphen)  - Assessment/ Interpretation/ Impression - of observations in context of problem -       - - improved, worsened no change   -->  (arrow)    - Plan - action to be done in response to assessment -       - - either that day either or after discharge       --->>  (double arrow)   - cont same plan (eg --->> lisinopril  =  Cont lisinopril )        ([place])   - degrees    [   ] (brackets)  - [author's (my own) comments/thoughts/additions/assumptions]       0'4 (apostrophe)   - instead of period - not stop highlighting entire line as a single unit - means 0.4           Abbrv'n    Abbreviation    - Time -   -    - date-time (month implied)  - (-2d)  - 2d ago   - d  - day(s)   - m - min(s)   - minute(s)   - h  - hr(s)   - hour(s)   - mo  - month(s)       - c  - with   - w /   - with     - s  - without   - w /o  - without     - x  - except     - p  - after   - a  - before     - c/w  - consistent with   - c/c  - compare/contrast - ie differentiate from       - sx  - symptomatic   - asx  - asymptomatic       - sx  - symptoms   - s/sx  - signs/symptoms       - dx  - diagnosis   - dz  - disease   - dz  - dizzy   - ez  - easy       - tx  - treatment (or transplant)   - Tx  - Transplant (eg. LRD RTx - Living Related Donor Renal Transplant)     - px  - prophylaxis   - pphx  - prophylaxis       - d/c  - discharge (eg from hospital or from eye)       - cx  - culture (or canceled)   - cx  - cancelled       - fx  - fracture   - rxn  - reaction   - fxn  - function       - yest  - yesterday - (or yday)   - kusum  - tomorrow       - nl  - normal   - abnl  - abnormal     - incr'd  -   - decr'd -     - hr (r)  - hyper-   - ho (o) - hypo-      - abx  - antibiotics       - cp  - chest pain   - sob  - shortness of breath       - dz/ lh - dizziness / lightheadedness   - HA - HeadAche (ha)   - H/N  - Head / Neck (h/n)       - f/c  - fevers/chills   - n/v  - nausea/vomiting       - cp  - chest pain/ pressure   - sob  - shortness of breath       - eg  - for example (e.g.)   - ie  - in other words (i.e.)       - 2   - secondary to (caused by, because of)   - 2/2  - secondary to (caused by, because of)     - dtr  - daughter   - mo  - mother   - fa  - father     - *o/w  - otherwise   - usu - usual     - * note: capital L used for visual clarity/emphasis where otherwise lower case is strictly correct -   - - eg in generic names drugs Labetalol or mL milliLiters        - [Cap]* - Capital  "Letter  - not need capital - only capital for clarity/emphasis in presentation here   - *Ctrl  - Control   - **Alt - Alternate         - BMBx  - Bone Marrow Biopsy             Anatomic Relationships -       - Lat - lateral (Lateral)        - prox  - proximal       Laterality (side) -   - bLat  - biLateral  - both sides   - iLat  - ipsiLateral  - same side   - cLat  - contraLateral  - opposite side       - L*  - Left*   - R  - Right   - B  - Bilateral   - (shandra)* - (any anatomical structure) + * (asterix) => Bilateral implied unless side / laterality specified    -- eg. Calf* => calf (B)    -- eg. Calf -  R .... L .... => different findings for R or L specified         - * note: capital L used for visual clarity/emphasis where otherwise lower case is strictly correct -   - - eg in generic names drugs Labetalol or mL milliLiters       - eg -     - UE  - Upper Extremity   - LE - Lower Extremity     - LUE   - Left Upper Extremity   - LLE  - Right Lower Extremity     - RUE  - Right Upper Extremity   - RLE  - Right Lower Extremity     - BU/LE  - Bilat Upper/Lower Extremities       __________________________________________                    KEYSTROKES FOR CONVENIENCE -   - from on-line summary article by Jesse Matson-- + \"all-around computer geek\"      - Working with words instead of letters   - Moving the Cursor   - Selecting text   - Combine in sequence   - Editing   - Formatting   - Functions         Working with words instead of letters -     - using L or R arrow  (<- / ->), Backspace (<--), or Delete keys to select single characters     - add Control key (option key for [employer] users) to apply to entire words or phrases          - C/<-   - Ctrl + L arrow  - move cursor to beginning of previous word   - C/->   - Ctrl + R arrow - move cursor to beginning of next word     - C/<--  - Ctrl + Backspace  - delete previous word   - C/delete  - Ctrl + Delete  - delete next word     - C/up   - Ctrl + up arrow  - " select text from cursor to beginning current paragraph or text entry field   - C/dn   - Ctrl + dn arrow  - select text from cursor to end current paragraph or text entry field                 Moving the Cursor -     - Home   -   - beginning of current line   - End   -   - end of current line   - C/Home  - Ctrl + Home  - top of text entry field   - C/End   - Ctrl + End  - bottom of text entry field   - pg up   - Page Up  - up a frame   - pg dn   - Page Down  - down a frame                 Selecting text -      - ^/end   - shift + end   - select to end of current line   - ^/home  - shift + home  - select to beginning of current line     - C/Lclk  - ctrl* + L click   - select individual smartphrases/entries/lines from  list   - ^/Lclk   - shift + L click   - select all smartphrases/entries/lines from initial selection to next     - ^<-   - shift + L arrow   - select characters one at a time   - ^->   - shift + R arrow   - select characters one at a time     - ^up    - shift  + up arrow   - select line above   - ^dn   - shift + dn arrow   - select line below     - ^C<-  - shift + ctrl + L arrow  - select words   - ^C->   - shift + ctrl + R arrow  - select words     - ^up  - shift + up arrow   - select paragraph above cursor   - ^dn   - shift + dn arrow   - select paragraph below  cursor      - ^Home  - shift + Home   - select text between cursor and beginning of line   - ^End   - shift + End   - select text between cursor and end of line     - ^C/Home - shift + ctrl + Home   - select text between cursor and beginning of next text entry field -------  - ^C/End   - shift + ctrl + End   - select text between cursor and end of next text entry field     - ^pg dn   - shift + page down   - select frame of text below cursor   - ^pg up   - shift + page up   - select frame of text above cursor     - C/A  - Ctrl + A    - select all text               Combine in sequence -     - ^End      - select to end line   - ^dn     - select  "line below   - (not need \"delete\" first, just start typing)               Editing -     - C/C   - Ctrl + C*   - copy selected text   - C/Insert  - Ctrl + Insert   - copy selected text     - C/X  -Ctrl + X*    - cut/delete selected text   - ^Del  - shift + Delete   - cut/delete selected text     - C/V   - Ctrl + V*   - paste   - ^Insert   - shift + Insert   - paste     - C/Z   - Ctrl + Z*   - undo   - C/Y   - Ctrl + Y*   - redo     - C/Z   - Ctrl + Z*   - go back 1 step   - A/<--  - Alt + Backspace   - go back 1 step (same as Ctrl + Z)                Formatting -     - C/B  - Ctrl + B*   - Bold   - C/I  - Ctrl + I*    - Italics   - C/U  - Ctrl .+ U*  - Underline               Functions -     - C/F   - Ctrl + F    - Find   - F3   - F3    - Find Next   - ^F3  - shift + F3   - Find Previous     - C/O  - Ctrl + O   - Open   - C/S   - Ctrl + S   - Save   - C/N   - Ctrl + N   - New doc   - C/P   - Ctrl + P   - Print     - A   - Alt**    - activate application's menu bar   - A/F   - Alt + F    - open File menu   - A/E  - Alt + E    - open Edit menu   - A/V  - Alt + V    - open View menu       ______________________________                                                        "

## 2018-01-02 NOTE — PROGRESS NOTES
Respiratory was paged and came to suction pt's mouth at approx 2000. Pt's breathing improved. 02 was 91. Sitter in room with pt.  Will cont to monitor.

## 2018-01-02 NOTE — PLAN OF CARE
Problem: Patient Care Overview  Goal: Team Discussion  Team Plan:   Outcome: No Change  BEHAVIORAL TEAM DISCUSSION    Participants: Dr. Kwong, Case Management, Nursing staff, Psych associates  Progress: Patient's zyprexa and ativan were discontinued. Patient is currently having trouble swallowing and needs continuios 1:1 care and at time 2:1. Patient will likely discharge back to group home once stabilized on medication to baseline.  Continued Stay Criteria/Rationale: Patient is unable to walk, eat well, or swallow and needs continued stabilization before discharge.  Medical/Physical: DD , Blind  Precautions:   Behavioral Orders   Procedures     Assault precautions     Code 1 - Restrict to Unit     Fall precautions     Routine Programming     As clinically indicated     Status 15     Every 15 minutes.     Plan: stabilize patient on medications and discharge patient back to group home once back to baseline.  Rationale for change in precautions or plan: patient needs continuous observation and assistance by 1:1 staff.

## 2018-01-02 NOTE — PROGRESS NOTES
[facility] Psychiatric Progress Note      Interim History:   Pt seen, team meeting held nursing staff.  Patient was unable to participate in psychiatric interview secondary to somnolence. He was observed this morning to be alert and able to take his medications PO, but eventually returned to sleeping comfortably. Contacted group home this morning. They reported that pt has been pulling his pants down for the past several years and this is not a new behavior. Group home was updated that pt's discharge will be deferred as he has been unable to swallow properly and unsteady on his feet. Zyprexa will be discontinued as he has Haldol and Ativan PRN onboard.      Review of systems:   Unable to be obtained on account of patient's mental status      Medications:       divalproex  1,000 mg Oral QPM     QUEtiapine  100 mg Oral TID     chlorhexidine  15 mL Mouth/Throat BID     cholecalciferol (vitamin D3) tablet 2,000 Units  2,000 Units Oral Daily     dorzolamide-timolol  1 drop Right Eye BID     levothyroxine (SYNTHROID/LEVOTHROID) tablet 50 mcg  50 mcg Oral Daily     metoprolol (TOPROL-XL) 24 hr tablet 25 mg  25 mg Oral Daily     risperiDONE (risperDAL M-TABS) ODT tab 1 mg  1 mg Oral QAM     simvastatin (ZOCOR) tablet 20 mg  20 mg Oral At Bedtime     Risperidone  3 mg Sublingual At Bedtime     LORazepam, haloperidol lactate, hydrOXYzine, acetaminophen, diphenhydrAMINE (BENADRYL) capsule 50 mg, hydrocortisone, triamcinolone, OLANZapine zydis    Mental Status Examination:     Appearance Sitting in chair, dressed in scrubs. Appears stated age. Disheveled   Attitude Uncooperative   Orientation Possibly oriented to person   Eye Contact Pt is blind   Speech Paucity of speech, short responses   Language Normal   Psychomotor Behavior Normal   Mood Irritable, agitated   Affect Irritable and striking out   Thought Process Disorganized, paucity of spontaneous thought   Associations Fair   Thought Content Patient is  currently negative for suicidal or homicidal ideation.   Fund of Knowledge Impaired   Insight Poor   Judgement Poor   Attention Span & Concentration Limited   Recent & Remote Memory Limited   Gait Unsteady   Muscle Tone Intact on visual inspection         Labs/Vitals:   No results found for this or any previous visit (from the past 24 hour(s)).  B/P: 129/82, T: 97.8, P: 81, R: 16    Impression:   Hugh remains highly agitated at times despite three atypical antipsychotics.     12/28/17  Pt was admitted for reportedly escalating violent behavior at his group home. Trial of Depakote 500mg qhs was started.    12/29/17  Pt continues to be agitated, but redirectable. He has been sleeping intermittently. When awake, he grabs staff but does not attempt to hit them. Depakote was increased to 1000mg qhs.    1/1/18  Pt was highly agitated in the am and required restraints. He was given Ativan and Zyprexa IM together without proper communication and resulted in extreme sedation. IM Haldol and Ativan were provided.    1/2/18  Pt is less agitated this morning but was unable to swallow properly or ambulate without assistance. Discharge was deferred and group home was notified. Discontinued Zyprexa PRN.      DIagnoses:   1. Autism Spectrum   2. Intellectual Disability formerly known as MR  3. Likely Dementia   4. Blind         Plan:   1. Discontinue Zyprexa, Haldol and Ativan are available.  2. Continue hospitalization.    Attestation:  Patient has been seen and evaluated by me,  Justice Kwong MD

## 2018-01-02 NOTE — PLAN OF CARE
Problem: Behavioral Disturbance  Goal: Behavioral Disturbance  1. Patient will take medication and mood become stable  2. Medication regime established  3. Follow up care in place and transition back to group home   Pt remains to be sedated this shift, only waking to take his medications and to eat his food.  He spent the majority of the shift sleeping in the Lisha chair.  Pt is unable to ambulate well on his own and needs assist of 2.  He is having difficulty swallowing solid foods and diet was switched to mechanical soft.  Pt is coughing up secretions and breathing with more ease.  RT came to reassess this shift. He was able to eat 25% of food and drink 240 mL fluid with breakfast and eat 25% of food and 60 mL of fluid with lunch.  Pt was able to toilet with assistance. VS T 97.4 axillary /40 P 68 RR 20 SPO2 96% on room air

## 2018-01-02 NOTE — PROGRESS NOTES
Pt  Still sedated this AM but did get up to take his medications. Pt able to swallow water and medications. Pt transferred to Lisha chair with assist of 2. Pt having trouble at this point swallowing solids so diet changed to mechanical soft. 02 sats 96% RR 20's Temp at 0800 99 axillary and then at 0930 98.3.  Patient unable to walk very well at this point. Group Home updated and Psychiatrist updated regarding patient's decline and will hold off on DC today. Does not feel a need to call Hospitalist at this point

## 2018-01-03 ENCOUNTER — APPOINTMENT (OUTPATIENT)
Dept: GENERAL RADIOLOGY | Facility: CLINIC | Age: 68
DRG: 884 | End: 2018-01-03
Attending: NURSE PRACTITIONER
Payer: COMMERCIAL

## 2018-01-03 LAB
ALBUMIN SERPL-MCNC: 2.8 G/DL (ref 3.4–5)
ALP SERPL-CCNC: 73 U/L (ref 40–150)
ALT SERPL W P-5'-P-CCNC: 20 U/L (ref 0–70)
ANION GAP SERPL CALCULATED.3IONS-SCNC: 8 MMOL/L (ref 3–14)
AST SERPL W P-5'-P-CCNC: 32 U/L (ref 0–45)
BILIRUB SERPL-MCNC: 1.1 MG/DL (ref 0.2–1.3)
BUN SERPL-MCNC: 25 MG/DL (ref 7–30)
CALCIUM SERPL-MCNC: 9.6 MG/DL (ref 8.5–10.1)
CHLORIDE SERPL-SCNC: 107 MMOL/L (ref 94–109)
CO2 SERPL-SCNC: 25 MMOL/L (ref 20–32)
CREAT SERPL-MCNC: 0.61 MG/DL (ref 0.66–1.25)
ERYTHROCYTE [DISTWIDTH] IN BLOOD BY AUTOMATED COUNT: 13.4 % (ref 10–15)
GFR SERPL CREATININE-BSD FRML MDRD: >90 ML/MIN/1.7M2
GLUCOSE SERPL-MCNC: 103 MG/DL (ref 70–99)
HCT VFR BLD AUTO: 41.4 % (ref 40–53)
HGB BLD-MCNC: 13.8 G/DL (ref 13.3–17.7)
MCH RBC QN AUTO: 30.5 PG (ref 26.5–33)
MCHC RBC AUTO-ENTMCNC: 33.3 G/DL (ref 31.5–36.5)
MCV RBC AUTO: 92 FL (ref 78–100)
PLATELET # BLD AUTO: 171 10E9/L (ref 150–450)
POTASSIUM SERPL-SCNC: 3.9 MMOL/L (ref 3.4–5.3)
PROCALCITONIN SERPL-MCNC: 0.76 NG/ML
PROT SERPL-MCNC: 6.8 G/DL (ref 6.8–8.8)
RBC # BLD AUTO: 4.52 10E12/L (ref 4.4–5.9)
SODIUM SERPL-SCNC: 140 MMOL/L (ref 133–144)
WBC # BLD AUTO: 19.1 10E9/L (ref 4–11)

## 2018-01-03 PROCEDURE — 99233 SBSQ HOSP IP/OBS HIGH 50: CPT | Performed by: NURSE PRACTITIONER

## 2018-01-03 PROCEDURE — 71045 X-RAY EXAM CHEST 1 VIEW: CPT

## 2018-01-03 PROCEDURE — 25000132 ZZH RX MED GY IP 250 OP 250 PS 637: Performed by: NURSE PRACTITIONER

## 2018-01-03 PROCEDURE — 25000132 ZZH RX MED GY IP 250 OP 250 PS 637: Performed by: PSYCHIATRY & NEUROLOGY

## 2018-01-03 PROCEDURE — 80053 COMPREHEN METABOLIC PANEL: CPT | Performed by: NURSE PRACTITIONER

## 2018-01-03 PROCEDURE — 85027 COMPLETE CBC AUTOMATED: CPT | Performed by: NURSE PRACTITIONER

## 2018-01-03 PROCEDURE — 84145 PROCALCITONIN (PCT): CPT | Performed by: NURSE PRACTITIONER

## 2018-01-03 PROCEDURE — 12400006 ZZH R&B MH INTERMEDIATE

## 2018-01-03 PROCEDURE — 36415 COLL VENOUS BLD VENIPUNCTURE: CPT | Performed by: NURSE PRACTITIONER

## 2018-01-03 PROCEDURE — 87631 RESP VIRUS 3-5 TARGETS: CPT | Performed by: NURSE PRACTITIONER

## 2018-01-03 RX ORDER — LEVOFLOXACIN 750 MG/1
750 TABLET, FILM COATED ORAL DAILY
Status: DISCONTINUED | OUTPATIENT
Start: 2018-01-03 | End: 2018-01-04

## 2018-01-03 RX ADMIN — QUETIAPINE FUMARATE 100 MG: 100 TABLET ORAL at 17:48

## 2018-01-03 RX ADMIN — DIVALPROEX SODIUM 1000 MG: 500 TABLET, EXTENDED RELEASE ORAL at 20:58

## 2018-01-03 RX ADMIN — SIMVASTATIN 20 MG: 10 TABLET, FILM COATED ORAL at 20:56

## 2018-01-03 RX ADMIN — DORZOLAMIDE HYDROCHLORIDE AND TIMOLOL MALEATE 1 DROP: 20; 5 SOLUTION/ DROPS OPHTHALMIC at 17:49

## 2018-01-03 RX ADMIN — QUETIAPINE FUMARATE 100 MG: 100 TABLET ORAL at 08:56

## 2018-01-03 RX ADMIN — METOPROLOL SUCCINATE 25 MG: 25 TABLET, EXTENDED RELEASE ORAL at 08:56

## 2018-01-03 RX ADMIN — LEVOFLOXACIN 750 MG: 750 TABLET, FILM COATED ORAL at 20:56

## 2018-01-03 RX ADMIN — QUETIAPINE FUMARATE 100 MG: 100 TABLET ORAL at 20:56

## 2018-01-03 RX ADMIN — RISPERIDONE 1 MG: 1 TABLET, ORALLY DISINTEGRATING ORAL at 08:56

## 2018-01-03 RX ADMIN — LEVOTHYROXINE SODIUM 50 MCG: 50 TABLET ORAL at 08:56

## 2018-01-03 RX ADMIN — RISPERIDONE 3 MG: 1 TABLET, ORALLY DISINTEGRATING ORAL at 20:56

## 2018-01-03 RX ADMIN — VITAMIN D, TAB 1000IU (100/BT) 2000 UNITS: 25 TAB at 08:56

## 2018-01-03 ASSESSMENT — ACTIVITIES OF DAILY LIVING (ADL)
GROOMING: TOTAL CARE
ORAL_HYGIENE: TOTAL CARE
GROOMING: TOTAL CARE
DRESS: SCRUBS (BEHAVIORAL HEALTH)
ORAL_HYGIENE: TOTAL CARE
DRESS: SCRUBS (BEHAVIORAL HEALTH);WITH ASSISTANCE

## 2018-01-03 NOTE — PLAN OF CARE
Problem: Behavioral Disturbance  Goal: Behavioral Disturbance  1. Patient will take medication and mood become stable  2. Medication regime established  3. Follow up care in place and transition back to group home   Outcome: No Change  Patient bedrested nearly the entire shift noted to be sweating. Got up around dinner time for dinner and for medication. Patient seemed slightly lethargic. Was irritable. Not interested in eating ate 15% of meal, but drank well. Patient walked the lounge and worked with his beads. Voided and had a bowel movement. After dinner the patient was not walking as efficient, would walk with his knees and back bent. Patient sat down on the floor twice and fell to his knees once while getting out of a chair despite 1:1 assistance. No injuries seem to be sustained. Was seen by the house doctor. Stated often that he wanted to go to bed. Would become agitated when we tried to keep him up. Patient reluctantly got up for PM medications and cares. Took medications with applesauce. Washed face and arm pits. Brushed teeth, pericare and barrier cream applied. Patient refused fluids and a snack at this time, wanted to go back to bed.

## 2018-01-03 NOTE — CODE/RAPID RESPONSE
[facility]  [facility] RRT Note  1/2/2018   Time Called: 1747    RRT called for: Staff assisted fall    Assessment & Plan   IMPRESSION & PLAN:  Patient had an assisted fall to the ground. Patient was attempting to lay on the ground while being transferred to a chair. No injuries sustained. Patient is MR/DD and is lethargic and non-verbal at baseline. Patient with no acute neurologic or musculoskeletal deficits.       INTERVENTIONS:  - assisted patient back to chair  - focused neuro assessment negative for abnormalities   - focused musculoskeletal assessment for abnormalities   - Imaging deferred at this time in the setting of no acute changes, low fall to ground, and patient is not on anticoagulation.      Interval History      Mr. Hugh Molina is a 67-year-old male with past medical history significant for mental retardation, developmental delay, hyperlipidemia, essential hypertension with autism, hypothyroidism and blindness who presents today from his group home for increasing aggressive behavior and agitation along with inappropriate behavior and insomnia.     Code Status: Full Code    Mart Calvo, APRN, CNP    Allergies   No Known Allergies    Physical Exam   Vital Signs with Ranges:  Temp:  [97.4  F (36.3  C)-99  F (37.2  C)] 98.8  F (37.1  C)  Pulse:  [] 108  Resp:  [20-33] 33  BP: (104-143)/(40-69) 132/61  SpO2:  [91 %-96 %] 96 %  I/O last 3 completed shifts:  In: 300 [P.O.:300]  Out: -     Constitutional: Well-appearing MR male, laying on floor post fall  Pulmonary: Regular respiratory rate and rhythm, symmetrical chest rise  Cardiovascular: No JVD, patient appears warm and well perfused  GI: Deferred  Skin/Integumen: Warm and dry, dark brusies noted to bilateral knees  Neuro: MR/DD, follows commands, non-verbal, alert, unable to determine orientation   Extremities: Moves all extremities     Data     EKG:  - No results    ABG:  -No lab results found in last 7 days.    Troponin:     No lab results found.    IMAGING: (X-ray/CT/MRI)   No results found for this or any previous visit (from the past 24 hour(s)).    CBC with Diff:  Recent Labs   Lab Test  12/28/17   1212   WBC  6.1   HGB  14.8   MCV  93   PLT  211      No results found for: RETICABSCT  No results found for: RETP    Lactic Acid:    No results found for: LACT        Comprehensive Metabolic Panel:    Recent Labs  Lab 12/28/17  1212      POTASSIUM 4.0   CHLORIDE 108   CO2 25   ANIONGAP 7   GLC 91   BUN 14   CR 0.77   GFRESTIMATED >90   GFRESTBLACK >90   WENDI 9.4       INR:    No lab results found.    D-DIMER:  No components found for: DDIMER    BNP:  No results found for: BNP    UA:    Recent Labs  Lab 12/29/17 2000   COLOR Yellow   APPEARANCE Slightly Cloudy   URINEGLC Negative   URINEBILI Negative   URINEKETONE 10*   SG 1.020   UBLD Negative   URINEPH 6.0   PROTEIN 10*   NITRITE Negative   LEUKEST Small*   RBCU 2   WBCU 3*       Time Spent on this Encounter   I spent 30 minutes on the unit/floor managing the care of Hugh Molina. Over 50% of my time was spent counseling the patient and/or coordinating care regarding services listed in this note.

## 2018-01-03 NOTE — PLAN OF CARE
Problem: Behavioral Disturbance  Goal: Behavioral Disturbance  1. Patient will take medication and mood become stable  2. Medication regime established  3. Follow up care in place and transition back to group home   Pt bedrested and napped the majority of the shift.  He only got up to eat, take medications, and toilet.  He ambulated with assist of two and frequently states that he wants to go to bed when attempting to ambulate him. He ate 100% of breakfast and 75% of lunch, is drinking fluids without difficulty.

## 2018-01-03 NOTE — PROGRESS NOTES
"[facility]    Hospitalist Progress Note    Date of Service (when I saw the patient): 01/03/2018    Assessment & Plan   Hugh Molina is a 67 year old male with past medical history significant for mental retardation, developmental delay, autism, essential hypertension, hyperlipidemia, hypothyroidism and blindness who presented initially on 12/28/2017 with increasing aggressive behavior, agitation, inappropriate behavior and insomnia from his group home.  Today hospitalist service was notified by staff regarding 100.3 temperature and increasing lethargy (pt requesting to \"go to bed\").    Lethargy and increased temperature likely 2/2 hospital community acquired pneumonia vs less likely viral illness vs medication reaction vs UTI given recent UA results.  Pt's temperature noted to be 100.3 axillary today, HR 92, RR 24, and O2 sats 96%.  ROS gathered by nursing staff in setting of pt's baseline mental status.  Intermittent cough and increasing lethargy past \"couple days\".  Pt did not want to eat yesterday, but had increased appetite today.  Per notes, no difficulty with swallowing.  - Portable CXR ordered in setting pt's intermittent agitation, lethargy, and baseline mentation - noted Scattered patchy opacities in both lungs, in mid to lower right lung as well as lateral left lung. These findings could relate to relatively early pneumonia.   - CMP and CBC ordered - WBC 19.1, up from 6.1 on admission, CMP pending, repeat BMP/CBC in am.  - Procalcitonin ordered - pending  - Influenza A/B/RSV PCR ordered  - Sputum culture and gram stain ordered, to be collected  - Started levaquin 750 mg PO daily 1/3/2018  - Pulse oximetry spot checks with all VS and with any noted respiratory distress  - PRN APAP for temperature >101.5        Aggressive behavior and agitation in the setting of mental retardation, developmental delay and autism.   -- Diagnosis to be managed per psychiatry.   -- The patient on risperidone " "prior to admission.  Psychiatry has currently resumed; also started pt on depakote and seroquel.   -- PRN haldol, atarax and ativan ordered per primary.       Hyperlipidemia.   -- Continue prior to admission simvastatin.       Essential hypertension.   -- Continue prior to admission metoprolol with hold parameters.       Hypothyroidism.   -- Continue prior to admission levothyroxine.   -- TSH on admission 1.36.       DVT Prophylaxis: Low Risk/Ambulatory with no VTE prophylaxis indicated and Ambulate every shift    Code Status: Full Code    Disposition: Per primary service psychiatry.    Geoff Mercer, APRN, CNP  Hospitalist    Interval History   Pt requesting to \"go to bed\" throughout shift today to nursing staff.  Increased lethargy per staff, affecting gait.  Noted low grade temperature.      -Data reviewed today: I reviewed all new labs and imaging results over the last 24 hours. I personally reviewed no images or EKG's today.    Physical Exam   Temp: 100.3  F (37.9  C) Temp src: Axillary BP: 140/52 Pulse: 92   Resp: 24 SpO2: 96 % O2 Device: None (Room air)    There were no vitals filed for this visit.  Vital Signs with Ranges  Temp:  [97.4  F (36.3  C)-100.3  F (37.9  C)] 100.3  F (37.9  C)  Pulse:  [] 92  Resp:  [22-33] 24  BP: (125-140)/(52-61) 140/52  SpO2:  [96 %-98 %] 96 %  I/O last 3 completed shifts:  In: 720 [P.O.:720]  Out: -     Constitutional: Pt lying in bed on R side supine, resting, intermittently agitated with physical examination  HEENT:  Normocephalic, atraumatic.  Oropharynx with moist mucous membranes.  No sores noted.  Poor dentition noted.    NECK:  Normal range of motion.  No nuchal rigidity noted.  Supple.  No adenopathy noted.  Respiratory: No increased work of breath, faint fine crackles noted RML/RLL posteriorly, L lung clear throughout, no wheezing noted.  Cardiovascular: Normal apical pulse, regular rate and rhythm, normal S1 and S2.  No murmur, rub or gallop noted.   GI: Normal " bowel sounds, soft, nondistended, nontender.  No masses palpated.  No guarding, rebound, tenderness noted.   Skin/Integumen: Warm and dry.  No lesions or rashes noted.     Medications        divalproex  1,000 mg Oral QPM     QUEtiapine  100 mg Oral TID     chlorhexidine  15 mL Mouth/Throat BID     cholecalciferol (vitamin D3) tablet 2,000 Units  2,000 Units Oral Daily     dorzolamide-timolol  1 drop Right Eye BID     levothyroxine (SYNTHROID/LEVOTHROID) tablet 50 mcg  50 mcg Oral Daily     metoprolol (TOPROL-XL) 24 hr tablet 25 mg  25 mg Oral Daily     risperiDONE (risperDAL M-TABS) ODT tab 1 mg  1 mg Oral QAM     simvastatin (ZOCOR) tablet 20 mg  20 mg Oral At Bedtime     Risperidone  3 mg Sublingual At Bedtime       Data     Recent Labs  Lab 01/03/18  1700 12/28/17  1212   WBC 19.1* 6.1   HGB 13.8 14.8   MCV 92 93    211   NA  --  140   POTASSIUM  --  4.0   CHLORIDE  --  108   CO2  --  25   BUN  --  14   CR  --  0.77   ANIONGAP  --  7   WENDI  --  9.4   GLC  --  91     No results found for this or any previous visit (from the past 24 hour(s)).

## 2018-01-04 LAB
ANION GAP SERPL CALCULATED.3IONS-SCNC: 7 MMOL/L (ref 3–14)
BUN SERPL-MCNC: 21 MG/DL (ref 7–30)
CALCIUM SERPL-MCNC: 9.7 MG/DL (ref 8.5–10.1)
CHLORIDE SERPL-SCNC: 106 MMOL/L (ref 94–109)
CO2 SERPL-SCNC: 27 MMOL/L (ref 20–32)
CREAT SERPL-MCNC: 0.69 MG/DL (ref 0.66–1.25)
ERYTHROCYTE [DISTWIDTH] IN BLOOD BY AUTOMATED COUNT: 13.4 % (ref 10–15)
FLUAV+FLUBV RNA SPEC QL NAA+PROBE: NEGATIVE
FLUAV+FLUBV RNA SPEC QL NAA+PROBE: NEGATIVE
GFR SERPL CREATININE-BSD FRML MDRD: >90 ML/MIN/1.7M2
GLUCOSE SERPL-MCNC: 104 MG/DL (ref 70–99)
HCT VFR BLD AUTO: 39.5 % (ref 40–53)
HGB BLD-MCNC: 13.6 G/DL (ref 13.3–17.7)
MCH RBC QN AUTO: 31.9 PG (ref 26.5–33)
MCHC RBC AUTO-ENTMCNC: 34.4 G/DL (ref 31.5–36.5)
MCV RBC AUTO: 93 FL (ref 78–100)
PLATELET # BLD AUTO: 175 10E9/L (ref 150–450)
POTASSIUM SERPL-SCNC: 3.6 MMOL/L (ref 3.4–5.3)
RBC # BLD AUTO: 4.27 10E12/L (ref 4.4–5.9)
RSV RNA SPEC NAA+PROBE: NEGATIVE
SODIUM SERPL-SCNC: 140 MMOL/L (ref 133–144)
SPECIMEN SOURCE: NORMAL
WBC # BLD AUTO: 18.1 10E9/L (ref 4–11)

## 2018-01-04 PROCEDURE — 85027 COMPLETE CBC AUTOMATED: CPT | Performed by: NURSE PRACTITIONER

## 2018-01-04 PROCEDURE — 99232 SBSQ HOSP IP/OBS MODERATE 35: CPT | Performed by: INTERNAL MEDICINE

## 2018-01-04 PROCEDURE — 25000132 ZZH RX MED GY IP 250 OP 250 PS 637: Performed by: PSYCHIATRY & NEUROLOGY

## 2018-01-04 PROCEDURE — 25000128 H RX IP 250 OP 636: Performed by: INTERNAL MEDICINE

## 2018-01-04 PROCEDURE — 80048 BASIC METABOLIC PNL TOTAL CA: CPT | Performed by: NURSE PRACTITIONER

## 2018-01-04 PROCEDURE — 25000132 ZZH RX MED GY IP 250 OP 250 PS 637: Performed by: NURSE PRACTITIONER

## 2018-01-04 PROCEDURE — 12400006 ZZH R&B MH INTERMEDIATE

## 2018-01-04 PROCEDURE — 40000915 ZZH STATISTIC SITTER, EVENING HOURS

## 2018-01-04 PROCEDURE — 36415 COLL VENOUS BLD VENIPUNCTURE: CPT | Performed by: NURSE PRACTITIONER

## 2018-01-04 RX ORDER — AMPICILLIN AND SULBACTAM 2; 1 G/1; G/1
3 INJECTION, POWDER, FOR SOLUTION INTRAMUSCULAR; INTRAVENOUS EVERY 6 HOURS
Status: DISCONTINUED | OUTPATIENT
Start: 2018-01-04 | End: 2018-01-05

## 2018-01-04 RX ADMIN — VITAMIN D, TAB 1000IU (100/BT) 2000 UNITS: 25 TAB at 09:11

## 2018-01-04 RX ADMIN — METOPROLOL SUCCINATE 25 MG: 25 TABLET, EXTENDED RELEASE ORAL at 09:09

## 2018-01-04 RX ADMIN — RISPERIDONE 3 MG: 1 TABLET, ORALLY DISINTEGRATING ORAL at 22:35

## 2018-01-04 RX ADMIN — DIVALPROEX SODIUM 1000 MG: 500 TABLET, EXTENDED RELEASE ORAL at 19:30

## 2018-01-04 RX ADMIN — AMPICILLIN SODIUM AND SULBACTAM SODIUM 3 G: 2; 1 INJECTION, POWDER, FOR SOLUTION INTRAMUSCULAR; INTRAVENOUS at 17:44

## 2018-01-04 RX ADMIN — QUETIAPINE FUMARATE 100 MG: 100 TABLET ORAL at 09:10

## 2018-01-04 RX ADMIN — AMPICILLIN SODIUM AND SULBACTAM SODIUM 3 G: 2; 1 INJECTION, POWDER, FOR SOLUTION INTRAMUSCULAR; INTRAVENOUS at 11:21

## 2018-01-04 RX ADMIN — Medication 15 ML: at 22:47

## 2018-01-04 RX ADMIN — SIMVASTATIN 20 MG: 10 TABLET, FILM COATED ORAL at 20:36

## 2018-01-04 RX ADMIN — QUETIAPINE FUMARATE 100 MG: 100 TABLET ORAL at 16:57

## 2018-01-04 RX ADMIN — DORZOLAMIDE HYDROCHLORIDE AND TIMOLOL MALEATE 1 DROP: 20; 5 SOLUTION/ DROPS OPHTHALMIC at 17:44

## 2018-01-04 RX ADMIN — Medication 15 ML: at 09:13

## 2018-01-04 RX ADMIN — DORZOLAMIDE HYDROCHLORIDE AND TIMOLOL MALEATE 1 DROP: 20; 5 SOLUTION/ DROPS OPHTHALMIC at 09:12

## 2018-01-04 RX ADMIN — SODIUM CHLORIDE 1000 ML: 9 INJECTION, SOLUTION INTRAVENOUS at 09:24

## 2018-01-04 RX ADMIN — LEVOTHYROXINE SODIUM 50 MCG: 50 TABLET ORAL at 09:09

## 2018-01-04 RX ADMIN — RISPERIDONE 1 MG: 1 TABLET, ORALLY DISINTEGRATING ORAL at 09:09

## 2018-01-04 RX ADMIN — AMPICILLIN SODIUM AND SULBACTAM SODIUM 3 G: 2; 1 INJECTION, POWDER, FOR SOLUTION INTRAMUSCULAR; INTRAVENOUS at 23:08

## 2018-01-04 RX ADMIN — QUETIAPINE FUMARATE 100 MG: 100 TABLET ORAL at 22:35

## 2018-01-04 NOTE — PLAN OF CARE
Problem: Patient Care Overview  Goal: Plan of Care/Patient Progress Review  Outcome: No Change  Pt. Spent the entire shift sleeping, waking only to use bathroom and to eat. Ate about 50% of his meal. Pt. Easily aroused but then would go back to sleep. Congestive cough, unable to produce sputum. Lung sounds clear. VSS. Receiving 1 L of fluids and x1 day of IV antibiotics, then switch back to PO. Calm and cooperative. ST unable to do assessment today, try tomorrow if patient will continue to be in hospital. No difficulty noted with meals.

## 2018-01-04 NOTE — PROGRESS NOTES
[facility] Psychiatric Progress Note      Interim History:   Pt seen, team meeting held nursing staff.  Pt has been sleeping and waking up only intermittently. He is able to ambulate with assist x2, eating and voiding. He is being treated for suspected aspiration pneumonia with IV Unasyn.     Review of systems:   Unable to be obtained on account of patient's mental status      Medications:       ampicillin-sulbactam (UNASYN) IV  3 g Intravenous Q6H     divalproex  1,000 mg Oral QPM     QUEtiapine  100 mg Oral TID     chlorhexidine  15 mL Mouth/Throat BID     cholecalciferol (vitamin D3) tablet 2,000 Units  2,000 Units Oral Daily     dorzolamide-timolol  1 drop Right Eye BID     levothyroxine (SYNTHROID/LEVOTHROID) tablet 50 mcg  50 mcg Oral Daily     metoprolol (TOPROL-XL) 24 hr tablet 25 mg  25 mg Oral Daily     risperiDONE (risperDAL M-TABS) ODT tab 1 mg  1 mg Oral QAM     simvastatin (ZOCOR) tablet 20 mg  20 mg Oral At Bedtime     Risperidone  3 mg Sublingual At Bedtime     LORazepam, haloperidol lactate, hydrOXYzine, acetaminophen, diphenhydrAMINE (BENADRYL) capsule 50 mg, hydrocortisone, triamcinolone    Mental Status Examination:     Appearance Laying in bed, dressed in scrubs. Appears stated age. Disheveled   Attitude Uncooperative, redirectable   Orientation Possibly oriented to person   Eye Contact Pt is blind   Speech Paucity of speech, short responses   Language Normal   Psychomotor Behavior Agitated at times   Mood More stable, somewhat irritable   Affect Irritable and striking out   Thought Process Disorganized, paucity of spontaneous thought   Associations Fair   Thought Content Patient is currently negative for suicidal or homicidal ideation.   Fund of Knowledge Impaired   Insight Poor   Judgement Poor   Attention Span & Concentration Limited   Recent & Remote Memory Limited   Gait Unsteady   Muscle Tone Intact on visual inspection         Labs/Vitals:     Recent Results (from the  past 24 hour(s))   CBC with platelets    Collection Time: 01/03/18  5:00 PM   Result Value Ref Range    WBC 19.1 (H) 4.0 - 11.0 10e9/L    RBC Count 4.52 4.4 - 5.9 10e12/L    Hemoglobin 13.8 13.3 - 17.7 g/dL    Hematocrit 41.4 40.0 - 53.0 %    MCV 92 78 - 100 fl    MCH 30.5 26.5 - 33.0 pg    MCHC 33.3 31.5 - 36.5 g/dL    RDW 13.4 10.0 - 15.0 %    Platelet Count 171 150 - 450 10e9/L   Comprehensive metabolic panel    Collection Time: 01/03/18  5:00 PM   Result Value Ref Range    Sodium 140 133 - 144 mmol/L    Potassium 3.9 3.4 - 5.3 mmol/L    Chloride 107 94 - 109 mmol/L    Carbon Dioxide 25 20 - 32 mmol/L    Anion Gap 8 3 - 14 mmol/L    Glucose 103 (H) 70 - 99 mg/dL    Urea Nitrogen 25 7 - 30 mg/dL    Creatinine 0.61 (L) 0.66 - 1.25 mg/dL    GFR Estimate >90 >60 mL/min/1.7m2    GFR Estimate If Black >90 >60 mL/min/1.7m2    Calcium 9.6 8.5 - 10.1 mg/dL    Bilirubin Total 1.1 0.2 - 1.3 mg/dL    Albumin 2.8 (L) 3.4 - 5.0 g/dL    Protein Total 6.8 6.8 - 8.8 g/dL    Alkaline Phosphatase 73 40 - 150 U/L    ALT 20 0 - 70 U/L    AST 32 0 - 45 U/L   Procalcitonin    Collection Time: 01/03/18  5:00 PM   Result Value Ref Range    Procalcitonin 0.76 ng/ml   Influenza A and B and RSV PCR    Collection Time: 01/03/18  9:05 PM   Result Value Ref Range    Specimen Description Nasopharyngeal     Influenza A PCR Negative NEG^Negative    Influenza B PCR Negative NEG^Negative    Resp Syncytial Virus Negative NEG^Negative   CBC with platelets    Collection Time: 01/04/18  8:21 AM   Result Value Ref Range    WBC 18.1 (H) 4.0 - 11.0 10e9/L    RBC Count 4.27 (L) 4.4 - 5.9 10e12/L    Hemoglobin 13.6 13.3 - 17.7 g/dL    Hematocrit 39.5 (L) 40.0 - 53.0 %    MCV 93 78 - 100 fl    MCH 31.9 26.5 - 33.0 pg    MCHC 34.4 31.5 - 36.5 g/dL    RDW 13.4 10.0 - 15.0 %    Platelet Count 175 150 - 450 10e9/L   Basic metabolic panel    Collection Time: 01/04/18  8:21 AM   Result Value Ref Range    Sodium 140 133 - 144 mmol/L    Potassium 3.6 3.4 - 5.3  mmol/L    Chloride 106 94 - 109 mmol/L    Carbon Dioxide 27 20 - 32 mmol/L    Anion Gap 7 3 - 14 mmol/L    Glucose 104 (H) 70 - 99 mg/dL    Urea Nitrogen 21 7 - 30 mg/dL    Creatinine 0.69 0.66 - 1.25 mg/dL    GFR Estimate >90 >60 mL/min/1.7m2    GFR Estimate If Black >90 >60 mL/min/1.7m2    Calcium 9.7 8.5 - 10.1 mg/dL     B/P: 129/82, T: 97.8, P: 81, R: 16    Impression:   Hugh remains highly agitated at times despite three atypical antipsychotics.     12/28/17  Pt was admitted for reportedly escalating violent behavior at his group home. Trial of Depakote 500mg qhs was started.    12/29/17  Pt continues to be agitated, but redirectable. He has been sleeping intermittently. When awake, he grabs staff but does not attempt to hit them. Depakote was increased to 1000mg qhs.    1/1/18  Pt was highly agitated in the am and required restraints. He was given Ativan and Zyprexa IM together without proper communication and resulted in extreme sedation. IM Haldol and Ativan were provided.    1/2/18  Pt is less agitated this morning but was unable to swallow properly or ambulate without assistance. Discharge was deferred and group home was notified. Discontinued Zyprexa PRN.    1/3/18  Pt was more alert this morning and redirectable. He will remain until he has further stabilized.    1/4/18  Pt had been sleeping more often, but remained about baseline cognition. He is now being treated for pneumonia with IV antibiotics.      DIagnoses:   1. Autism Spectrum   2. Intellectual Disability formerly known as MR  3. Likely Dementia   4. Blind  5. Possible aspiration pneumonia         Plan:   1. Continue current medications.  2. Continue hospitalization.    Attestation:  Patient has been seen and evaluated by me,  Justice Kwong MD

## 2018-01-04 NOTE — PROGRESS NOTES
[facility] Psychiatric Progress Note      Interim History:   Pt seen, team meeting held nursing staff.  Pt was more alert this morning, agitated at times but redirectable. He is tolerating medications well. Plan for discharge soon back to group home once he is stable.      Review of systems:   Unable to be obtained on account of patient's mental status      Medications:       ampicillin-sulbactam (UNASYN) IV  3 g Intravenous Q6H     sodium chloride 0.9%  1,000 mL Intravenous Once     divalproex  1,000 mg Oral QPM     QUEtiapine  100 mg Oral TID     chlorhexidine  15 mL Mouth/Throat BID     cholecalciferol (vitamin D3) tablet 2,000 Units  2,000 Units Oral Daily     dorzolamide-timolol  1 drop Right Eye BID     levothyroxine (SYNTHROID/LEVOTHROID) tablet 50 mcg  50 mcg Oral Daily     metoprolol (TOPROL-XL) 24 hr tablet 25 mg  25 mg Oral Daily     risperiDONE (risperDAL M-TABS) ODT tab 1 mg  1 mg Oral QAM     simvastatin (ZOCOR) tablet 20 mg  20 mg Oral At Bedtime     Risperidone  3 mg Sublingual At Bedtime     LORazepam, haloperidol lactate, hydrOXYzine, acetaminophen, diphenhydrAMINE (BENADRYL) capsule 50 mg, hydrocortisone, triamcinolone    Mental Status Examination:     Appearance Sitting in chair, dressed in scrubs. Appears stated age. Disheveled   Attitude Uncooperative, redirectable   Orientation Possibly oriented to person   Eye Contact Pt is blind   Speech Paucity of speech, short responses   Language Normal   Psychomotor Behavior Normal   Mood Irritable, agitated   Affect Irritable and striking out   Thought Process Disorganized, paucity of spontaneous thought   Associations Fair   Thought Content Patient is currently negative for suicidal or homicidal ideation.   Fund of Knowledge Impaired   Insight Poor   Judgement Poor   Attention Span & Concentration Limited   Recent & Remote Memory Limited   Gait Unsteady   Muscle Tone Intact on visual inspection         Labs/Vitals:     Recent Results  (from the past 24 hour(s))   CBC with platelets    Collection Time: 01/03/18  5:00 PM   Result Value Ref Range    WBC 19.1 (H) 4.0 - 11.0 10e9/L    RBC Count 4.52 4.4 - 5.9 10e12/L    Hemoglobin 13.8 13.3 - 17.7 g/dL    Hematocrit 41.4 40.0 - 53.0 %    MCV 92 78 - 100 fl    MCH 30.5 26.5 - 33.0 pg    MCHC 33.3 31.5 - 36.5 g/dL    RDW 13.4 10.0 - 15.0 %    Platelet Count 171 150 - 450 10e9/L   Comprehensive metabolic panel    Collection Time: 01/03/18  5:00 PM   Result Value Ref Range    Sodium 140 133 - 144 mmol/L    Potassium 3.9 3.4 - 5.3 mmol/L    Chloride 107 94 - 109 mmol/L    Carbon Dioxide 25 20 - 32 mmol/L    Anion Gap 8 3 - 14 mmol/L    Glucose 103 (H) 70 - 99 mg/dL    Urea Nitrogen 25 7 - 30 mg/dL    Creatinine 0.61 (L) 0.66 - 1.25 mg/dL    GFR Estimate >90 >60 mL/min/1.7m2    GFR Estimate If Black >90 >60 mL/min/1.7m2    Calcium 9.6 8.5 - 10.1 mg/dL    Bilirubin Total 1.1 0.2 - 1.3 mg/dL    Albumin 2.8 (L) 3.4 - 5.0 g/dL    Protein Total 6.8 6.8 - 8.8 g/dL    Alkaline Phosphatase 73 40 - 150 U/L    ALT 20 0 - 70 U/L    AST 32 0 - 45 U/L   Procalcitonin    Collection Time: 01/03/18  5:00 PM   Result Value Ref Range    Procalcitonin 0.76 ng/ml   Influenza A and B and RSV PCR    Collection Time: 01/03/18  9:05 PM   Result Value Ref Range    Specimen Description Nasopharyngeal     Influenza A PCR Negative NEG^Negative    Influenza B PCR Negative NEG^Negative    Resp Syncytial Virus Negative NEG^Negative   CBC with platelets    Collection Time: 01/04/18  8:21 AM   Result Value Ref Range    WBC 18.1 (H) 4.0 - 11.0 10e9/L    RBC Count 4.27 (L) 4.4 - 5.9 10e12/L    Hemoglobin 13.6 13.3 - 17.7 g/dL    Hematocrit 39.5 (L) 40.0 - 53.0 %    MCV 93 78 - 100 fl    MCH 31.9 26.5 - 33.0 pg    MCHC 34.4 31.5 - 36.5 g/dL    RDW 13.4 10.0 - 15.0 %    Platelet Count 175 150 - 450 10e9/L   Basic metabolic panel    Collection Time: 01/04/18  8:21 AM   Result Value Ref Range    Sodium 140 133 - 144 mmol/L    Potassium 3.6 3.4 -  5.3 mmol/L    Chloride 106 94 - 109 mmol/L    Carbon Dioxide 27 20 - 32 mmol/L    Anion Gap 7 3 - 14 mmol/L    Glucose 104 (H) 70 - 99 mg/dL    Urea Nitrogen 21 7 - 30 mg/dL    Creatinine 0.69 0.66 - 1.25 mg/dL    GFR Estimate >90 >60 mL/min/1.7m2    GFR Estimate If Black >90 >60 mL/min/1.7m2    Calcium 9.7 8.5 - 10.1 mg/dL     B/P: 129/82, T: 97.8, P: 81, R: 16    Impression:   Hugh remains highly agitated at times despite three atypical antipsychotics.     12/28/17  Pt was admitted for reportedly escalating violent behavior at his group home. Trial of Depakote 500mg qhs was started.    12/29/17  Pt continues to be agitated, but redirectable. He has been sleeping intermittently. When awake, he grabs staff but does not attempt to hit them. Depakote was increased to 1000mg qhs.    1/1/18  Pt was highly agitated in the am and required restraints. He was given Ativan and Zyprexa IM together without proper communication and resulted in extreme sedation. IM Haldol and Ativan were provided.    1/2/18  Pt is less agitated this morning but was unable to swallow properly or ambulate without assistance. Discharge was deferred and group home was notified. Discontinued Zyprexa PRN.    1/3/18  Pt was more alert this morning and redirectable. He will remain until he has further stabilized.      DIagnoses:   1. Autism Spectrum   2. Intellectual Disability formerly known as MR  3. Likely Dementia   4. Blind         Plan:   1. Continue current medications.  2. Continue hospitalization.    Attestation:  Patient has been seen and evaluated by me,  Justice Kwong MD

## 2018-01-04 NOTE — PROGRESS NOTES
[facility]    Hospitalist Progress Note    Interval History   - Discussed with nurses who noted patient had an aspiration episode a few days ago while sedated on Ativan requiring suctioning. Generally no difficulty swallowing.  - Notable fever this morning  - Patient continues to be lethargic    Assessment & Plan   Summary: Hugh oMlina is a 67 year old male with PMH intellectual disability, DD, autism, HTN, HLD, hypothyroidism, blindness who was admitted on 12/28/2017 with aggressive behavior, agitation. Hospitalist service consulted for fever, lethargy, treating for suspected aspiration PNA.    Suspected aspiration pneumonia: Reported aspiration episode around ~1/1 with hypoxia requiring suctioning, followed by lethargy and fever 1/3. CXR shows scattered patchy b/l opacities, noted leukocytosis with WBC to 19.1k. Procalcitonin positive. Influenza negative. Given dose of Levaquin PO 1/3/18.  - Will transition to Unasyn IV given concern for aspiration PNA today  - NS 1L bolus (to reduce time tethered to IV)  - Recheck CBC tomorrow  - Sputum cx not collected  - Blood cultures PRN for fever > 101.5  - Speech path consult  - Aspiration precautions  - Pulse ox q shift    Aggressive behavior and agitation:  - Currently hospitalized by psychiatry    HLD: PTA simvastatin  HTN: PTA metoprolol with hold parameters  Hypothyroidism: TSH on admission 1.36. PTA levothyroxine.    DVT Prophylaxis: Low Risk/Ambulatory with no VTE prophylaxis indicated  Code Status: Full Code    Disposition: Per primary service psychiatry    Roosevelt Dennison MD  Text Page  (7am to 6pm)  -Data reviewed today: I reviewed all new labs and imaging results over the last 24 hours. I personally reviewed CXR.    Physical Exam   Temp: 98.8  F (37.1  C) Temp src: Axillary BP: 140/52 Pulse: 92   Resp: 24 SpO2: 96 % O2 Device: None (Room air)    Vitals:    01/03/18 1900   Weight: 75.1 kg (165 lb 8 oz)     Vital Signs with Ranges  Temp:   [97.4  F (36.3  C)-101.4  F (38.6  C)] 98.8  F (37.1  C)  Pulse:  [75-92] 92  Resp:  [22-24] 24  BP: (125-140)/(52-55) 140/52  SpO2:  [96 %-98 %] 96 %  I/O last 3 completed shifts:  In: 920 [P.O.:920]  Out: -   O2 requirements: None    Constitutional: Male in NAD, disheveled  HEENT: Oral mucosa dry  Cardiovascular: RRR, normal S1/2, no m/r/g  Respiratory: CTAB, no wheezing or crackles  Vascular: PIVs noted, no LE edema, noted distal pedal pulses  GI: No organomegaly, normoactive bowel sounds, nontender, nondistended  Skin/Integumen: No rashes or scars  Neuro/Psych: Noted intellectual disability    Medications        levofloxacin  750 mg Oral Daily     divalproex  1,000 mg Oral QPM     QUEtiapine  100 mg Oral TID     chlorhexidine  15 mL Mouth/Throat BID     cholecalciferol (vitamin D3) tablet 2,000 Units  2,000 Units Oral Daily     dorzolamide-timolol  1 drop Right Eye BID     levothyroxine (SYNTHROID/LEVOTHROID) tablet 50 mcg  50 mcg Oral Daily     metoprolol (TOPROL-XL) 24 hr tablet 25 mg  25 mg Oral Daily     risperiDONE (risperDAL M-TABS) ODT tab 1 mg  1 mg Oral QAM     simvastatin (ZOCOR) tablet 20 mg  20 mg Oral At Bedtime     Risperidone  3 mg Sublingual At Bedtime       Data     Recent Labs  Lab 01/03/18  1700 12/28/17  1212   WBC 19.1* 6.1   HGB 13.8 14.8   MCV 92 93    211    140   POTASSIUM 3.9 4.0   CHLORIDE 107 108   CO2 25 25   BUN 25 14   CR 0.61* 0.77   ANIONGAP 8 7   WENDI 9.6 9.4   * 91   ALBUMIN 2.8*  --    PROTTOTAL 6.8  --    BILITOTAL 1.1  --    ALKPHOS 73  --    ALT 20  --    AST 32  --        Imaging:   Recent Results (from the past 24 hour(s))   XR Chest Port 1 View    Narrative    CHEST PORTABLE ONE VIEW  1/3/2018 4:50 PM     HISTORY: Fever, fine crackles right lung.     COMPARISON: None.      Impression    IMPRESSION: Heart size is normal. Scattered patchy opacities in both  lungs, in mid to lower right lung as well as lateral left lung. These  findings could relate to  relatively early pneumonia. Consider  continued radiographic and clinical surveillance. No pleural effusion,  pneumothorax.    CLALUM LOCKHART MD

## 2018-01-04 NOTE — PLAN OF CARE
Problem: Patient Care Overview  Goal: Plan of Care/Patient Progress Review  SLP: Orders received. Chart reviewed and dicussed with RN. Pt tolerated breakfast this am and has been lethargic, but easily aroused today. Question if aspiration event was related to medications/behaviors. ST will check chart and discuss with nursing tomorrow if pt is appropriate for ST evaluation. If DC soon, pt may benefit from SLP evaluation/treat in home health setting to train caregivers if needed.

## 2018-01-04 NOTE — PLAN OF CARE
Problem: Behavioral Disturbance  Goal: Behavioral Disturbance  1. Patient will take medication and mood become stable  2. Medication regime established  3. Follow up care in place and transition back to group home   Outcome: Declining  Patient spent the majority of the shift sleeping in bed. He would only reluctantly get up to use the bathroom and to eat. Walked well across the lounge multiple times. Ate 80% of his meal, drank a glass of orange juice and pop. Voided once on the toilet and had a wet depends once. Patient again laid down after dinner. When getting up to do PM cares and evening meds, patient was much more reluctant to walk, was an assist of 2. Patient spiked a fever this shift. Hospitalist was contacted. X-rays revealed blilateral opacities, and blood draw revealed WBC 19. Was started on antibiotics. Nose Swab for influenza is pending. Hospitalist ordered strict I & O, daily weights, and a sputum culture. Sputum culture was not able to be collected this shift. Hospitalist stated that they will be following him daily, and to report any significant changes.

## 2018-01-05 ENCOUNTER — APPOINTMENT (OUTPATIENT)
Dept: SPEECH THERAPY | Facility: CLINIC | Age: 68
DRG: 884 | End: 2018-01-05
Attending: INTERNAL MEDICINE
Payer: COMMERCIAL

## 2018-01-05 LAB
BASOPHILS # BLD AUTO: 0 10E9/L (ref 0–0.2)
BASOPHILS NFR BLD AUTO: 0.1 %
DIFFERENTIAL METHOD BLD: ABNORMAL
EOSINOPHIL # BLD AUTO: 0.1 10E9/L (ref 0–0.7)
EOSINOPHIL NFR BLD AUTO: 1 %
ERYTHROCYTE [DISTWIDTH] IN BLOOD BY AUTOMATED COUNT: 13.5 % (ref 10–15)
HCT VFR BLD AUTO: 36.7 % (ref 40–53)
HGB BLD-MCNC: 12.5 G/DL (ref 13.3–17.7)
IMM GRANULOCYTES # BLD: 0.2 10E9/L (ref 0–0.4)
IMM GRANULOCYTES NFR BLD: 1.2 %
LYMPHOCYTES # BLD AUTO: 1.1 10E9/L (ref 0.8–5.3)
LYMPHOCYTES NFR BLD AUTO: 8.1 %
MCH RBC QN AUTO: 31.5 PG (ref 26.5–33)
MCHC RBC AUTO-ENTMCNC: 34.1 G/DL (ref 31.5–36.5)
MCV RBC AUTO: 92 FL (ref 78–100)
MONOCYTES # BLD AUTO: 1.8 10E9/L (ref 0–1.3)
MONOCYTES NFR BLD AUTO: 13.1 %
NEUTROPHILS # BLD AUTO: 10.3 10E9/L (ref 1.6–8.3)
NEUTROPHILS NFR BLD AUTO: 76.5 %
NRBC # BLD AUTO: 0 10*3/UL
NRBC BLD AUTO-RTO: 0 /100
PLATELET # BLD AUTO: 178 10E9/L (ref 150–450)
RBC # BLD AUTO: 3.97 10E12/L (ref 4.4–5.9)
WBC # BLD AUTO: 13.5 10E9/L (ref 4–11)

## 2018-01-05 PROCEDURE — 99233 SBSQ HOSP IP/OBS HIGH 50: CPT | Performed by: INTERNAL MEDICINE

## 2018-01-05 PROCEDURE — 25000128 H RX IP 250 OP 636: Performed by: INTERNAL MEDICINE

## 2018-01-05 PROCEDURE — 25000132 ZZH RX MED GY IP 250 OP 250 PS 637: Performed by: NURSE PRACTITIONER

## 2018-01-05 PROCEDURE — 40000915 ZZH STATISTIC SITTER, EVENING HOURS

## 2018-01-05 PROCEDURE — 25000132 ZZH RX MED GY IP 250 OP 250 PS 637: Performed by: PSYCHIATRY & NEUROLOGY

## 2018-01-05 PROCEDURE — 92610 EVALUATE SWALLOWING FUNCTION: CPT | Mod: GN | Performed by: SPEECH-LANGUAGE PATHOLOGIST

## 2018-01-05 PROCEDURE — 25000132 ZZH RX MED GY IP 250 OP 250 PS 637: Performed by: INTERNAL MEDICINE

## 2018-01-05 PROCEDURE — 40000225 ZZH STATISTIC SLP WARD VISIT: Performed by: SPEECH-LANGUAGE PATHOLOGIST

## 2018-01-05 PROCEDURE — 85025 COMPLETE CBC W/AUTO DIFF WBC: CPT | Performed by: INTERNAL MEDICINE

## 2018-01-05 PROCEDURE — 12400006 ZZH R&B MH INTERMEDIATE

## 2018-01-05 PROCEDURE — 36415 COLL VENOUS BLD VENIPUNCTURE: CPT | Performed by: INTERNAL MEDICINE

## 2018-01-05 RX ADMIN — DIVALPROEX SODIUM 1000 MG: 500 TABLET, EXTENDED RELEASE ORAL at 20:40

## 2018-01-05 RX ADMIN — VITAMIN D, TAB 1000IU (100/BT) 2000 UNITS: 25 TAB at 08:33

## 2018-01-05 RX ADMIN — RISPERIDONE 1 MG: 1 TABLET, ORALLY DISINTEGRATING ORAL at 08:33

## 2018-01-05 RX ADMIN — RISPERIDONE 3 MG: 1 TABLET, ORALLY DISINTEGRATING ORAL at 20:41

## 2018-01-05 RX ADMIN — SIMVASTATIN 20 MG: 10 TABLET, FILM COATED ORAL at 20:41

## 2018-01-05 RX ADMIN — AMPICILLIN SODIUM AND SULBACTAM SODIUM 3 G: 2; 1 INJECTION, POWDER, FOR SOLUTION INTRAMUSCULAR; INTRAVENOUS at 10:46

## 2018-01-05 RX ADMIN — LEVOTHYROXINE SODIUM 50 MCG: 50 TABLET ORAL at 08:33

## 2018-01-05 RX ADMIN — Medication 15 ML: at 20:42

## 2018-01-05 RX ADMIN — METOPROLOL SUCCINATE 25 MG: 25 TABLET, EXTENDED RELEASE ORAL at 08:33

## 2018-01-05 RX ADMIN — AMPICILLIN SODIUM AND SULBACTAM SODIUM 3 G: 2; 1 INJECTION, POWDER, FOR SOLUTION INTRAMUSCULAR; INTRAVENOUS at 05:26

## 2018-01-05 RX ADMIN — DORZOLAMIDE HYDROCHLORIDE AND TIMOLOL MALEATE 1 DROP: 20; 5 SOLUTION/ DROPS OPHTHALMIC at 08:33

## 2018-01-05 RX ADMIN — AMOXICILLIN AND CLAVULANATE POTASSIUM 1 TABLET: 875; 125 TABLET, FILM COATED ORAL at 20:40

## 2018-01-05 RX ADMIN — QUETIAPINE FUMARATE 100 MG: 100 TABLET ORAL at 20:41

## 2018-01-05 RX ADMIN — QUETIAPINE FUMARATE 100 MG: 100 TABLET ORAL at 08:33

## 2018-01-05 RX ADMIN — QUETIAPINE FUMARATE 100 MG: 100 TABLET ORAL at 16:34

## 2018-01-05 RX ADMIN — DORZOLAMIDE HYDROCHLORIDE AND TIMOLOL MALEATE 1 DROP: 20; 5 SOLUTION/ DROPS OPHTHALMIC at 16:34

## 2018-01-05 ASSESSMENT — ACTIVITIES OF DAILY LIVING (ADL)
GROOMING: SHOWER;WITH ASSISTANCE
ORAL_HYGIENE: INDEPENDENT
LAUNDRY: UNABLE TO COMPLETE
DRESS: WITH ASSISTANCE
GROOMING: SHOWER;INDEPENDENT
ORAL_HYGIENE: WITH ASSISTANCE
LAUNDRY: WITH SUPERVISION
DRESS: SCRUBS (BEHAVIORAL HEALTH);WITH ASSISTANCE
GROOMING: WITH ASSISTANCE

## 2018-01-05 NOTE — PROGRESS NOTES
01/05/18 1239   General Information   Onset Date 12/28/17   Start of Care Date 01/05/18   Referring Physician Dr. Dennison   Patient Profile Review/OT: Additional Occupational Profile Info See Profile for full history and prior level of function   Patient/Family Goals Statement Patient unable to state.   Swallowing Evaluation Bedside swallow evaluation   Behaviorial Observations Alert  (after nursing helped wake patient)   Mode of current nutrition Oral diet   Type of oral diet Regular;Thin liquid   Respiratory Status Room air   Comments Per MD note: Hugh Molina is a 67 year old male with PMH intellectual disability, DD, autism, HTN, HLD, hypothyroidism, blindness who was admitted on 12/28/2017 with aggressive behavior, agitation. Hospitalist service consulted for fever, lethargy, treating for suspected aspiration PNA.    Nursing reports no overt signs of aspiration with meals today.    Limited po trials observed due to patient wanting to go back to sleep.   Clinical Swallow Evaluation   Oral Musculature unable to assess due to poor participation/comprehension   Clinical Swallow Eval: Thin Liquid Texture Trial   Mode of Presentation, Thin Liquids cup   Volume of Liquid or Food Presented sips x 3   Oral Phase of Swallow WFL   Pharyngeal Phase of Swallow intact   Diagnostic Statement no signs of aspiration    Clinical Swallow Eval: Puree Solid Texture Trial   Mode of Presentation, Puree spoon   Volume of Puree Presented tsps x 4   Oral Phase, Puree WFL   Pharyngeal Phase, Puree intact   Diagnostic Statement no signs of aspiration    Clinical Swallow Eval: Solid Food Texture Trial   Mode of Presentation, Solid self-fed   Volume of Solid Food Presented bites x 3   Oral Phase, Solid WFL   Pharyngeal Phase, Solid intact   Diagnostic Statement no signs with small pieces; impulsive with large piece putting whole piece in mouth with cough x 1   Swallow Compensations   Swallow Compensations Pacing;Reduce amounts;Alternate  viscosity of consistencies   Esophageal Phase of Swallow   Patient reports or presents with symptoms of esophageal dysphagia No   Swallow Eval: Clinical Impressions   Skilled Criteria for Therapy Intervention Skilled criteria met.  Treatment indicated.   Functional Assessment Scale (FAS) 5   Treatment Diagnosis mild oral-pharyngeal dysphagia   Diet texture recommendations Regular diet;Thin liquids   Recommended Feeding/Eating Techniques (see below)   Therapy Frequency (2 times/week)   Predicted Duration of Therapy Intervention (days/wks) 1 week   [facility]   Risks and Benefits of Treatment have been explained. Yes   Patient, family and/or staff in agreement with Plan of Care Yes   Clinical Impression Comments A bedside swallow evaluation was completed this pm with nursing assistance.  Patient tolerated puree, thin liquids, and small bites of solids without signs of aspiration during the evaluation.  Large impulsive bite of a solid resulted in cough x 1.  Overall, dysphagia appears to be mild related to impulsivity.  Suspect aspiration event a few days ago was related to lethargy from sedation.  Recommend a continued regular diet and thin liquids with increased use of precautions including: sit up at 90 degrees, only when alert, staff cut up food into small pieces, use a slow rate, alternate solids and liquids, hold po if increased aspiration signs noted with po intake.   Plan to provide 1-2 swallow Tx visits (2x/week) as IP vs with C SLP after discharge home   Total Evaluation Time   Total Evaluation Time (Minutes) 15

## 2018-01-05 NOTE — PROGRESS NOTES
[facility] Psychiatric Progress Note      Interim History:   Pt seen, team meeting held nursing staff.  Pt has been sleeping and waking up only intermittently. He is able to ambulate with assist x2, eating and voiding. He is being treated for suspected aspiration pneumonia with IV Unasyn.     Review of systems:   Unable to be obtained on account of patient's mental status      Medications:       ampicillin-sulbactam (UNASYN) IV  3 g Intravenous Q6H     divalproex  1,000 mg Oral QPM     QUEtiapine  100 mg Oral TID     chlorhexidine  15 mL Mouth/Throat BID     cholecalciferol (vitamin D3) tablet 2,000 Units  2,000 Units Oral Daily     dorzolamide-timolol  1 drop Right Eye BID     levothyroxine (SYNTHROID/LEVOTHROID) tablet 50 mcg  50 mcg Oral Daily     metoprolol (TOPROL-XL) 24 hr tablet 25 mg  25 mg Oral Daily     risperiDONE (risperDAL M-TABS) ODT tab 1 mg  1 mg Oral QAM     simvastatin (ZOCOR) tablet 20 mg  20 mg Oral At Bedtime     Risperidone  3 mg Sublingual At Bedtime     LORazepam, haloperidol lactate, hydrOXYzine, acetaminophen, diphenhydrAMINE (BENADRYL) capsule 50 mg, hydrocortisone, triamcinolone    Mental Status Examination:     Appearance Laying in bed, dressed in scrubs. Appears stated age. Disheveled   Attitude Uncooperative, redirectable   Orientation Possibly oriented to person   Eye Contact Pt is blind   Speech Paucity of speech, short responses   Language Normal   Psychomotor Behavior Agitated at times   Mood More stable, somewhat irritable   Affect Irritable and striking out   Thought Process Disorganized, paucity of spontaneous thought   Associations Fair   Thought Content Patient is currently negative for suicidal or homicidal ideation.   Fund of Knowledge Impaired   Insight Poor   Judgement Poor   Attention Span & Concentration Limited   Recent & Remote Memory Limited   Gait Unsteady   Muscle Tone Intact on visual inspection         Labs/Vitals:     No results found for this or  any previous visit (from the past 24 hour(s)).  B/P: 129/82, T: 97.8, P: 81, R: 16    Impression:   Hugh remains highly agitated at times despite three atypical antipsychotics.     12/28/17  Pt was admitted for reportedly escalating violent behavior at his group home. Trial of Depakote 500mg qhs was started.    12/29/17  Pt continues to be agitated, but redirectable. He has been sleeping intermittently. When awake, he grabs staff but does not attempt to hit them. Depakote was increased to 1000mg qhs.    1/1/18  Pt was highly agitated in the am and required restraints. He was given Ativan and Zyprexa IM together without proper communication and resulted in extreme sedation. IM Haldol and Ativan were provided.    1/2/18  Pt is less agitated this morning but was unable to swallow properly or ambulate without assistance. Discharge was deferred and group home was notified. Discontinued Zyprexa PRN.    1/3/18  Pt was more alert this morning and redirectable. He will remain until he has further stabilized.    1/4/18  Pt had been sleeping more often, but remained about baseline cognition. He is now being treated for pneumonia with IV antibiotics. Pt has been more cooperative and much less agitated spells. More readily redirected.  Will need to contact Group home for care conference next week.      DIagnoses:   1. Autism Spectrum   2. Intellectual Disability formerly known as MR  3. Likely Dementia   4. Blind  5. Possible aspiration pneumonia         Plan:   1. Continue current medications.  2. Continue hospitalization.    Attestation:  Patient has been seen and evaluated by me,  Justice Kwong MD

## 2018-01-05 NOTE — PLAN OF CARE
Problem: Patient Care Overview  Goal: Plan of Care/Patient Progress Review      Discharge Planner SLP   Patient plan for discharge: Unable to state  Current status: A bedside swallow evaluation was completed this pm with nursing assistance.  Patient tolerated puree, thin liquids, and small bites of solids without signs of aspiration during the evaluation.  Large impulsive bite of a solid resulted in cough x 1.  Overall, dysphagia appears to be mild related to impulsivity.  Suspect aspiration event a few days ago was related to lethargy from sedation.  Recommend a continued regular diet and thin liquids with increased use of precautions including: sit up at 90 degrees, only when alert, staff cut up food into small pieces, use a slow rate, alternate solids and liquids, hold po if increased aspiration signs noted with po intake.   Plan to provide 1-2 swallow Tx visits (2x/week) as IP vs with [facility] SLP after discharge home.    Barriers to return to prior living situation: None  Recommendations for discharge: Swallow Tx goal may be met prior to discharge vs [facility]HC SLP swallow Tx at group home  Rationale for recommendations: SLP Swallow Tx to insure diet tolerance and train precautions for caregivers       Entered by: Suzette Arrington 01/05/2018 12:28 PM

## 2018-01-05 NOTE — PLAN OF CARE
Problem: Patient Care Overview  Goal: Team Discussion  Team Plan:   Outcome: Improving  BEHAVIORAL TEAM DISCUSSION    Participants: Dr. Kwong, Case Management, Nursing staff, Psych associates  Progress: Patient continues need significant staff assistance with daily activities. Patient has been more cooperative with staff assistance. Patient was taken off IV antibiotics (switched to PO) this shift and PIV was removed. Patient needs further monitoring of intake and output. He continues to exhibit a wet cough.  Continued Stay Criteria/Rationale: Further stabilization on current medication before discharge planning, eventual discharge back to group home is possible.  Medical/Physical: BERTIN , Ashely  Precautions:   Behavioral Orders   Procedures     Aspiration precautions     Assault precautions     Code 1 - Restrict to Unit     Fall precautions     Routine Programming     As clinically indicated     Status 15     Every 15 minutes.     Plan: Have patient stabilize on current medication before planning for discharge possibly back to group home.  Rationale for change in precautions or plan: Patient has been more cooperative with staff assistance, but needs further stabilization.

## 2018-01-05 NOTE — PROGRESS NOTES
[facility]    Hospitalist Progress Note    Interval History   - No fever in past 24 hours. O2 sat in low 90s  - Per nurse patient ambulating without much issue earlier today, but sleeping now and difficult to arouse    Assessment & Plan   Summary: Hugh Molina is a 67 year old male with PMH intellectual disability, DD, autism, HTN, HLD, hypothyroidism, blindness who was admitted on 12/28/2017 with aggressive behavior, agitation. Hospitalist service consulted for fever, lethargy, treating for suspected aspiration PNA.    Aspiration pneumonia: Reported aspiration episode around ~1/1 with hypoxia requiring suctioning, followed by lethargy and fever 1/3. CXR shows scattered patchy b/l opacities, noted leukocytosis with WBC to 19.1k. Procalcitonin positive. Influenza negative. Given dose of Levaquin PO 1/3/18, transitioned to Unasyn IV until 1/5/18 with notable decreased in WBC count after starting Unasyn. Speech path consulted--no dysphagia.  - Complete course of antibiotics with Augmentin, likely 7-10 days  - Check CBC in 2 days  - Sputum cx not collected  - Blood cultures PRN for fever > 101.5  - Pulse ox and vitals q shift    Aggressive behavior and agitation:  - Currently hospitalized by psychiatry    HLD: PTA simvastatin  HTN: PTA metoprolol with hold parameters  Hypothyroidism: TSH on admission 1.36. PTA levothyroxine.    DVT Prophylaxis: Low Risk/Ambulatory with no VTE prophylaxis indicated  Code Status: Full Code    Disposition: Per primary service psychiatry    Roosevelt Dennison MD  Text Page  (7am to 6pm)  -Data reviewed today: I reviewed all new labs and imaging results over the last 24 hours. I personally reviewed CXR.    Physical Exam   Temp: 98.9  F (37.2  C) Temp src: Axillary BP: 135/88 Pulse: 93   Resp: 16 SpO2: 91 % O2 Device: None (Room air)    Vitals:    01/03/18 1900   Weight: 75.1 kg (165 lb 8 oz)     Vital Signs with Ranges  Temp:  [98.8  F (37.1  C)-98.9  F (37.2  C)] 98.9   F (37.2  C)  Pulse:  [85-93] 93  Resp:  [16-28] 16  BP: (133-137)/(67-88) 135/88  SpO2:  [91 %-92 %] 91 %  I/O last 3 completed shifts:  In: 1670 [P.O.:720; I.V.:950]  Out: 900 [Urine:900]  O2 requirements: None    Constitutional: Male in NAD, sleeping  HEENT: Oral mucosa dry  Cardiovascular: RRR, normal S1/2, no m/r/g  Respiratory: CTAB, no wheezing or crackles  Vascular: PIVs noted, no LE edema, noted distal pedal pulses  GI: No organomegaly, normoactive bowel sounds, nontender, nondistended  Skin/Integumen: No rashes or scars    Medications        amoxicillin-clavulanate  1 tablet Oral Q12H DELONTE     divalproex  1,000 mg Oral QPM     QUEtiapine  100 mg Oral TID     chlorhexidine  15 mL Mouth/Throat BID     cholecalciferol (vitamin D3) tablet 2,000 Units  2,000 Units Oral Daily     dorzolamide-timolol  1 drop Right Eye BID     levothyroxine (SYNTHROID/LEVOTHROID) tablet 50 mcg  50 mcg Oral Daily     metoprolol (TOPROL-XL) 24 hr tablet 25 mg  25 mg Oral Daily     risperiDONE (risperDAL M-TABS) ODT tab 1 mg  1 mg Oral QAM     simvastatin (ZOCOR) tablet 20 mg  20 mg Oral At Bedtime     Risperidone  3 mg Sublingual At Bedtime       Data     Recent Labs  Lab 01/05/18  1142 01/04/18  0821 01/03/18  1700   WBC 13.5* 18.1* 19.1*   HGB 12.5* 13.6 13.8   MCV 92 93 92    175 171   NA  --  140 140   POTASSIUM  --  3.6 3.9   CHLORIDE  --  106 107   CO2  --  27 25   BUN  --  21 25   CR  --  0.69 0.61*   ANIONGAP  --  7 8   WENDI  --  9.7 9.6   GLC  --  104* 103*   ALBUMIN  --   --  2.8*   PROTTOTAL  --   --  6.8   BILITOTAL  --   --  1.1   ALKPHOS  --   --  73   ALT  --   --  20   AST  --   --  32       Imaging:   No results found for this or any previous visit (from the past 24 hour(s)).

## 2018-01-05 NOTE — PLAN OF CARE
Problem: Behavioral Disturbance  Goal: Behavioral Disturbance  1. Patient will take medication and mood become stable  2. Medication regime established  3. Follow up care in place and transition back to group home   Outcome: Improving  Patient spent most of his time bed resting this shift. Woke up for breakfast and lunch and was up for about 2 hours at a time. Walked multiple laps out in the lounge. Ate 50% of breakfast and 75% of lunch. Drank a large cup of orange juice. Has a wet cough. Needs to be encouraged to cough. Voided one time in the bathroom. Had a swallow study, examiner stated that he had a strong swallow and just to watch for extra coughing when eating and drinking and make sure that he is taking small bites. Antibiotic IV was discontinued. PIV pulled. Will continue with PO antibiotic. Took a shower. He tolerated this well. Writer noticed a sore on his lip.

## 2018-01-05 NOTE — PROGRESS NOTES
Pt was up and down a few times throughout the shift. He voided 60 mL dark dipesh urine with sediment and was incontinent of urine once this shift.  Scrubs and linen changed.  Pt was able to ambulate to commode and bed with assist of 1. Continued with 1:1 sit.

## 2018-01-05 NOTE — PLAN OF CARE
Problem: Behavioral Disturbance  Goal: Behavioral Disturbance  1. Patient will take medication and mood become stable  2. Medication regime established  3. Follow up care in place and transition back to group home   Outcome: No Change  Pt was resting in bed for a bulk of the day, he has been up and continues to be unsteady on his feet.  Needing the assist of 2 staff at his side when transferring.  Continues to communicate with one word answers, accepting of all offered medications (takes them well when placed in applesauce).  IV ABX therapy is ongoing, patient did pull out his IV and site will be established for his next dose.  Loose non-productive cough.  He was up 4 times to toilet but twice he was not able to void (had produced urine output of 150 ml, later 175 ml and a small incontinent void).  1:1 staff at his side to keep him safe from falls and other impulsive actions.

## 2018-01-05 NOTE — PLAN OF CARE
Problem: Behavioral Disturbance  Goal: Behavioral Disturbance  1. Patient will take medication and mood become stable  2. Medication regime established  3. Follow up care in place and transition back to group home   Outcome: No Change  Patient continues to present with a flat and blunt affect. Patient slept majority of the shift, but did get up to eat meals and shower. Patient continues to be unsteady on his feet. Patient receive hour long IV medication at 1046. Patient ate 50% of breakfast and 75% of lunch. Patient was cooperative with medications this shift. Patient was more cooperative with staff assistance throughout the shift.

## 2018-01-05 NOTE — PROGRESS NOTES
Dinner and snack intake this shift:  mashed potato, carrots, mac&cheese, chocolate pudding, 1/2 granola bar.    Fluids documented in I&O flowsheet.    During HS toileting ant-care & under arms washed while Pt standing with warm water & some soap. Pt wearing fresh depends & new scrubs. Lotion applied on back.    No BM this shift.  Pt drank a prune juice mixed with some apple juice at HS.    Some oral care accomplished at HS with swab and clorhexidine.    Sputum not collected yet. However, Pt noted drooling while sleeping at times.

## 2018-01-06 ENCOUNTER — APPOINTMENT (OUTPATIENT)
Dept: SPEECH THERAPY | Facility: CLINIC | Age: 68
DRG: 884 | End: 2018-01-06
Payer: COMMERCIAL

## 2018-01-06 PROCEDURE — 40000257 ZZH STATISTIC CONSULT NO CHARGE VASC ACCESS

## 2018-01-06 PROCEDURE — 40000914 ZZH STATISTIC SITTER, DAY HOURS

## 2018-01-06 PROCEDURE — 92526 ORAL FUNCTION THERAPY: CPT | Mod: GN | Performed by: SPEECH-LANGUAGE PATHOLOGIST

## 2018-01-06 PROCEDURE — 40000225 ZZH STATISTIC SLP WARD VISIT: Performed by: SPEECH-LANGUAGE PATHOLOGIST

## 2018-01-06 PROCEDURE — 99231 SBSQ HOSP IP/OBS SF/LOW 25: CPT | Performed by: INTERNAL MEDICINE

## 2018-01-06 PROCEDURE — 25000132 ZZH RX MED GY IP 250 OP 250 PS 637: Performed by: INTERNAL MEDICINE

## 2018-01-06 PROCEDURE — 25000132 ZZH RX MED GY IP 250 OP 250 PS 637: Performed by: NURSE PRACTITIONER

## 2018-01-06 PROCEDURE — 25000128 H RX IP 250 OP 636: Performed by: INTERNAL MEDICINE

## 2018-01-06 PROCEDURE — 25000132 ZZH RX MED GY IP 250 OP 250 PS 637: Performed by: PSYCHIATRY & NEUROLOGY

## 2018-01-06 PROCEDURE — 12400006 ZZH R&B MH INTERMEDIATE

## 2018-01-06 PROCEDURE — 40000915 ZZH STATISTIC SITTER, EVENING HOURS

## 2018-01-06 RX ORDER — SENNOSIDES 8.6 MG
1-2 TABLET ORAL 2 TIMES DAILY PRN
Status: DISCONTINUED | OUTPATIENT
Start: 2018-01-06 | End: 2018-01-10 | Stop reason: HOSPADM

## 2018-01-06 RX ADMIN — AMOXICILLIN AND CLAVULANATE POTASSIUM 1 TABLET: 875; 125 TABLET, FILM COATED ORAL at 21:51

## 2018-01-06 RX ADMIN — SODIUM CHLORIDE 1000 ML: 9 INJECTION, SOLUTION INTRAVENOUS at 14:28

## 2018-01-06 RX ADMIN — DORZOLAMIDE HYDROCHLORIDE AND TIMOLOL MALEATE 1 DROP: 20; 5 SOLUTION/ DROPS OPHTHALMIC at 08:44

## 2018-01-06 RX ADMIN — Medication 15 ML: at 08:44

## 2018-01-06 RX ADMIN — METOPROLOL SUCCINATE 25 MG: 25 TABLET, EXTENDED RELEASE ORAL at 08:43

## 2018-01-06 RX ADMIN — VITAMIN D, TAB 1000IU (100/BT) 2000 UNITS: 25 TAB at 08:43

## 2018-01-06 RX ADMIN — QUETIAPINE FUMARATE 100 MG: 100 TABLET ORAL at 08:43

## 2018-01-06 RX ADMIN — QUETIAPINE FUMARATE 100 MG: 100 TABLET ORAL at 21:52

## 2018-01-06 RX ADMIN — DORZOLAMIDE HYDROCHLORIDE AND TIMOLOL MALEATE 1 DROP: 20; 5 SOLUTION/ DROPS OPHTHALMIC at 17:30

## 2018-01-06 RX ADMIN — DIVALPROEX SODIUM 1000 MG: 500 TABLET, EXTENDED RELEASE ORAL at 21:52

## 2018-01-06 RX ADMIN — SIMVASTATIN 20 MG: 10 TABLET, FILM COATED ORAL at 21:52

## 2018-01-06 RX ADMIN — AMOXICILLIN AND CLAVULANATE POTASSIUM 1 TABLET: 875; 125 TABLET, FILM COATED ORAL at 08:43

## 2018-01-06 RX ADMIN — LEVOTHYROXINE SODIUM 50 MCG: 50 TABLET ORAL at 08:43

## 2018-01-06 RX ADMIN — RISPERIDONE 3 MG: 1 TABLET, ORALLY DISINTEGRATING ORAL at 21:52

## 2018-01-06 RX ADMIN — RISPERIDONE 1 MG: 1 TABLET, ORALLY DISINTEGRATING ORAL at 08:43

## 2018-01-06 RX ADMIN — QUETIAPINE FUMARATE 100 MG: 100 TABLET ORAL at 17:30

## 2018-01-06 ASSESSMENT — ACTIVITIES OF DAILY LIVING (ADL)
GROOMING: WITH ASSISTANCE
DRESS: WITH ASSISTANCE
ORAL_HYGIENE: WITH ASSISTANCE
ORAL_HYGIENE: WITH ASSISTANCE
GROOMING: WITH ASSISTANCE
DRESS: WITH ASSISTANCE
LAUNDRY: WITH SUPERVISION

## 2018-01-06 NOTE — PLAN OF CARE
Problem: Behavioral Disturbance  Goal: Behavioral Disturbance  1. Patient will take medication and mood become stable  2. Medication regime established  3. Follow up care in place and transition back to group home   Outcome: No Change  Patient spent his time napping for the majority of shift. Got up for meals and to use the bathroom. Estimated time awake is 2 hours. Patient ate about 50% of breakfast and 25% of lunch, only drinking one cup of orange juice. Went to the bathroom once, had a bm that was hard. Patient also had a swallow sti

## 2018-01-06 NOTE — PLAN OF CARE
Problem: Behavioral Disturbance  Goal: Behavioral Disturbance  1. Patient will take medication and mood become stable  2. Medication regime established  3. Follow up care in place and transition back to group home   Outcome: No Change  Patient spent the majority of the shift bed resting in his room. Got up for meals and to use the bathroom. Ate 50% of breakfast and 25%. Only drank a cup of orange juice. Would not respond to prompts to drink more. Walked in the lounge multiple times. Seemed to have a productive cough when he was sitting upright. Had a hard stool and did not void. Had a swallow study completed. Their recommendations include: 1. Stay upright in a chair for all meals. 2. Cut up food to small pieces. 3. Small bites and sips. 4. Break up cookies and crackers and give one piece at a time (Bear he eats very fast). 5. Take tray away if lethargic or unable to manage impulsivity to ensure slow intake of food. Hospitalist Dr. Dennison was contacted regarding patient's low fluid intake and output. He ordered a liter of fluid and Senna PRN.

## 2018-01-06 NOTE — PROGRESS NOTES
[facility]    Hospitalist Progress Note    Interval History   - No fever, no hypoxia, noted coughing, eating without issue  - More decompensated psychiatrically per nurse    Assessment & Plan   Summary: Hugh Molina is a 67 year old male with PMH intellectual disability, DD, autism, HTN, HLD, hypothyroidism, blindness who was admitted on 12/28/2017 with aggressive behavior, agitation. Hospitalist service consulted for fever, lethargy, treating for aspiration PNA.    Aspiration pneumonia: Reported aspiration episode around ~1/1 with hypoxia requiring suctioning, followed by lethargy and fever 1/3. CXR shows scattered patchy b/l opacities, noted leukocytosis with WBC to 19.1k. Procalcitonin positive. Influenza negative. Given dose of Levaquin PO 1/3/18, transitioned to Unasyn IV until 1/5/18 with notable decreased in WBC count after starting Unasyn. Speech path consulted--no dysphagia.  - Complete course of antibiotics with Augmentin, likely 7-10 days  - Check CBC 1/7  - Sputum cx not collected  - Blood cultures PRN for fever > 101.5  - Pulse ox and vitals q shift    Aggressive behavior and agitation:  - Currently hospitalized by psychiatry    HLD: PTA simvastatin  HTN: PTA metoprolol with hold parameters  Hypothyroidism: TSH on admission 1.36. PTA levothyroxine.    DVT Prophylaxis: Low Risk/Ambulatory with no VTE prophylaxis indicated  Code Status: Full Code    Disposition: Per primary service psychiatry    Roosevelt Dennison MD  Text Page  (7am to 6pm)  -Data reviewed today: I reviewed all new labs and imaging results over the last 24 hours. I personally reviewed CXR.    Physical Exam   Temp: 98  F (36.7  C) Temp src: Axillary BP: 138/68 Pulse: 90   Resp: 16 SpO2: 91 % O2 Device: None (Room air)    Vitals:    01/03/18 1900   Weight: 75.1 kg (165 lb 8 oz)     Vital Signs with Ranges  Temp:  [98  F (36.7  C)] 98  F (36.7  C)  Pulse:  [71-90] 90  Resp:  [16-20] 16  BP: (123-138)/(56-68)  138/68  SpO2:  [91 %] 91 %  I/O last 3 completed shifts:  In: 300 [P.O.:200; I.V.:100]  Out: 175 [Urine:175]  O2 requirements: None    Constitutional: Male in NAD, sleeping  Cardiovascular: RRR, normal S1/2, no m/r/g  Respiratory: CTAB, no wheezing or crackles  Skin/Integumen: No rashes or scars    Medications        amoxicillin-clavulanate  1 tablet Oral Q12H DELONTE     divalproex  1,000 mg Oral QPM     QUEtiapine  100 mg Oral TID     chlorhexidine  15 mL Mouth/Throat BID     cholecalciferol (vitamin D3) tablet 2,000 Units  2,000 Units Oral Daily     dorzolamide-timolol  1 drop Right Eye BID     levothyroxine (SYNTHROID/LEVOTHROID) tablet 50 mcg  50 mcg Oral Daily     metoprolol (TOPROL-XL) 24 hr tablet 25 mg  25 mg Oral Daily     risperiDONE (risperDAL M-TABS) ODT tab 1 mg  1 mg Oral QAM     simvastatin (ZOCOR) tablet 20 mg  20 mg Oral At Bedtime     Risperidone  3 mg Sublingual At Bedtime       Data     Recent Labs  Lab 01/05/18  1142 01/04/18  0821 01/03/18  1700   WBC 13.5* 18.1* 19.1*   HGB 12.5* 13.6 13.8   MCV 92 93 92    175 171   NA  --  140 140   POTASSIUM  --  3.6 3.9   CHLORIDE  --  106 107   CO2  --  27 25   BUN  --  21 25   CR  --  0.69 0.61*   ANIONGAP  --  7 8   WENDI  --  9.7 9.6   GLC  --  104* 103*   ALBUMIN  --   --  2.8*   PROTTOTAL  --   --  6.8   BILITOTAL  --   --  1.1   ALKPHOS  --   --  73   ALT  --   --  20   AST  --   --  32       Imaging:   No results found for this or any previous visit (from the past 24 hour(s)).

## 2018-01-06 NOTE — PLAN OF CARE
Problem: Patient Care Overview  Goal: Plan of Care/Patient Progress Review  Discharge Planner SLP   Patient plan for discharge: not stated  Current status: Pt awakened for treatment, not happy with this.  Impulsive in biting alvino graham, took 2 bites right away and chewed them up but had residue in mouth when he started yelling about going back to sleep and lying down on bed.  Nursing pulled pt upright and insisted he remain upright and take 2 sips of water.  Eventually pt agreed and sipped water from cup lip in small amounts without overt s/s aspiration.  Residue in mouth cleared.  Residue was initially there due to pt stopping chewing and starting to yell, behavioral issue.  Educated caregivers with written instructions on not feeding him if lethargic or too impulsive to safely eat.  Need for supervision, cut foods into very small pieces, small bites and sips, in chair all meals and snack and upright 1 hour after intake.  Notice placed inside nursing station across from where patient eats all meals.  Caregivers also verbally educated on providing a safe environment for eating/drinking.  Goal met for tolerating regular/thin diet with supervision.  Will sign off at this time.  Barriers to return to prior living situation: mental health  Recommendations for discharge: follow recommendations provided by SLP at discharge to wherever he is placed.  Rationale for recommendations: Caregivers of this patient need to implement safe swallow strategies as devised by SLP for safe swallowing and to avoid aspiration of food or liquid.       Entered by: Daniel Gonzalez 01/06/2018 10:59 AM     Speech Language Therapy Discharge Summary    Reason for therapy discharge:    All goals and outcomes met, no further needs identified.    Progress towards therapy goal(s). [facility] for goal details.  Goals met    Therapy recommendation(s):    No further therapy is recommended.  Caregivers to  follow written recommendations posted at nursing station for safe swallowing of food and liquids and avoidance of aspiration.

## 2018-01-06 NOTE — PROGRESS NOTES
Pt walked with assist x2 to [facility]. Did not want to stay, so walked back to [facility]. Sat in chair & stood up multiple times to help staff with changing scrubs and depends. Yovana care done with soap and water due to incontinence x1, large amount of dipesh urine in depends. Up to commode 3 other times, produced 25-50cc urine each time. Small BM produced as well. Pt drank 1 prune juice mixed with lemonade this shift. Pt ate 25% of his meal and a granola bar. Fluids encouraged during shift. Meal saved in case he wants more. A sitter sat with him for the entire shift. IV antibiotics discontinued. Pt to continue with PO antibiotic Amox 800 mg po Q 6 hours. Sheets changed on bed. Oral care accomplished with both toothpaste and chlorhexidine.

## 2018-01-07 LAB
CREAT SERPL-MCNC: 0.52 MG/DL (ref 0.66–1.25)
DIFFERENTIAL METHOD BLD: ABNORMAL
ERYTHROCYTE [DISTWIDTH] IN BLOOD BY AUTOMATED COUNT: 14.3 % (ref 10–15)
GFR SERPL CREATININE-BSD FRML MDRD: >90 ML/MIN/1.7M2
HCT VFR BLD AUTO: 37 % (ref 40–53)
HGB BLD-MCNC: 12.2 G/DL (ref 13.3–17.7)
MCH RBC QN AUTO: 30.4 PG (ref 26.5–33)
MCHC RBC AUTO-ENTMCNC: 33 G/DL (ref 31.5–36.5)
MCV RBC AUTO: 92 FL (ref 78–100)
PLATELET # BLD AUTO: 207 10E9/L (ref 150–450)
RBC # BLD AUTO: 4.01 10E12/L (ref 4.4–5.9)
WBC # BLD AUTO: 8.9 10E9/L (ref 4–11)

## 2018-01-07 PROCEDURE — 40000915 ZZH STATISTIC SITTER, EVENING HOURS

## 2018-01-07 PROCEDURE — 25000132 ZZH RX MED GY IP 250 OP 250 PS 637: Performed by: NURSE PRACTITIONER

## 2018-01-07 PROCEDURE — 12400006 ZZH R&B MH INTERMEDIATE

## 2018-01-07 PROCEDURE — 99231 SBSQ HOSP IP/OBS SF/LOW 25: CPT | Performed by: INTERNAL MEDICINE

## 2018-01-07 PROCEDURE — 82565 ASSAY OF CREATININE: CPT | Performed by: INTERNAL MEDICINE

## 2018-01-07 PROCEDURE — 25000132 ZZH RX MED GY IP 250 OP 250 PS 637: Performed by: PSYCHIATRY & NEUROLOGY

## 2018-01-07 PROCEDURE — 36415 COLL VENOUS BLD VENIPUNCTURE: CPT | Performed by: INTERNAL MEDICINE

## 2018-01-07 PROCEDURE — 85025 COMPLETE CBC W/AUTO DIFF WBC: CPT | Performed by: INTERNAL MEDICINE

## 2018-01-07 PROCEDURE — 25000132 ZZH RX MED GY IP 250 OP 250 PS 637: Performed by: INTERNAL MEDICINE

## 2018-01-07 PROCEDURE — 40000914 ZZH STATISTIC SITTER, DAY HOURS

## 2018-01-07 RX ADMIN — DORZOLAMIDE HYDROCHLORIDE AND TIMOLOL MALEATE 1 DROP: 20; 5 SOLUTION/ DROPS OPHTHALMIC at 17:32

## 2018-01-07 RX ADMIN — QUETIAPINE FUMARATE 100 MG: 100 TABLET ORAL at 17:32

## 2018-01-07 RX ADMIN — AMOXICILLIN AND CLAVULANATE POTASSIUM 1 TABLET: 875; 125 TABLET, FILM COATED ORAL at 21:42

## 2018-01-07 RX ADMIN — RISPERIDONE 1 MG: 1 TABLET, ORALLY DISINTEGRATING ORAL at 08:26

## 2018-01-07 RX ADMIN — LEVOTHYROXINE SODIUM 50 MCG: 50 TABLET ORAL at 08:26

## 2018-01-07 RX ADMIN — RISPERIDONE 3 MG: 1 TABLET, ORALLY DISINTEGRATING ORAL at 21:42

## 2018-01-07 RX ADMIN — Medication 15 ML: at 21:42

## 2018-01-07 RX ADMIN — DORZOLAMIDE HYDROCHLORIDE AND TIMOLOL MALEATE 1 DROP: 20; 5 SOLUTION/ DROPS OPHTHALMIC at 08:28

## 2018-01-07 RX ADMIN — AMOXICILLIN AND CLAVULANATE POTASSIUM 1 TABLET: 875; 125 TABLET, FILM COATED ORAL at 08:26

## 2018-01-07 RX ADMIN — METOPROLOL SUCCINATE 25 MG: 25 TABLET, EXTENDED RELEASE ORAL at 08:26

## 2018-01-07 RX ADMIN — QUETIAPINE FUMARATE 100 MG: 100 TABLET ORAL at 08:26

## 2018-01-07 RX ADMIN — VITAMIN D, TAB 1000IU (100/BT) 2000 UNITS: 25 TAB at 08:25

## 2018-01-07 RX ADMIN — QUETIAPINE FUMARATE 100 MG: 100 TABLET ORAL at 21:41

## 2018-01-07 RX ADMIN — DIVALPROEX SODIUM 1000 MG: 500 TABLET, EXTENDED RELEASE ORAL at 21:42

## 2018-01-07 RX ADMIN — SIMVASTATIN 20 MG: 10 TABLET, FILM COATED ORAL at 21:41

## 2018-01-07 ASSESSMENT — ACTIVITIES OF DAILY LIVING (ADL)
ORAL_HYGIENE: WITH ASSISTANCE
DRESS: WITH ASSISTANCE
GROOMING: WITH ASSISTANCE
DRESS: WITH ASSISTANCE
ORAL_HYGIENE: WITH ASSISTANCE
GROOMING: WITH ASSISTANCE

## 2018-01-07 NOTE — PROGRESS NOTES
Patient woke for a snack around 1445. It was noticed by staff is left eye seemed puffier than usual as well as a puffy left arm. It was warm the to touch. Strong pulse and quick capillary refill. Hospitalist was paged.

## 2018-01-07 NOTE — PROGRESS NOTES
Pt was up and urinated on the commode. His urine is still dipesh with some sediment and a strong odor. He was having somewhat of a hard time breathing and was tossing and turning most of the night. Pt was also coughing throughout the night.

## 2018-01-07 NOTE — PLAN OF CARE
Problem: Fall Risk (Adult)  Goal: Identify Related Risk Factors and Signs and Symptoms  Related risk factors and signs and symptoms are identified upon initiation of Human Response Clinical Practice Guideline (CPG).   1. Patient will ambulate with a gait belt.  2. Patient will sit at the table in a gladys-chair.   Outcome: No Change  Patient spent the a large majority of the shift in bed resting. Patient got up for meals and to use the toilet was probably up for nearly 2 hours. Patient voided once. Ate 75% of his meals and drank 2 cups for breakfast. He ate 15% of his lunch and did not take any fluids. Patient walked  [employer] a few times today. While sitting down for lunch, he was sliding his chair in to the table, the chair stuck and  The leg buckled, patient tipped forward, his face ran into the wall and his head hit the table, the patient did not fall hard or fast as staff was there to control the fall. The patient did not seem to sustain injuries. House doctor assessed him. Did not order a CT, but directed staff to report any significant changes.

## 2018-01-07 NOTE — PROGRESS NOTES
[facility]  House Officer Progress Note    Time called: 12: 25 PM  Reason for call: Fall from seated position    Assessment/Plan:  Per Nursing patient was seated in a plastic chair rocking back and forth when chair buckled causing him to lean against the wall. Did not forcefully strike head. Was assisted to the floor. I found patient laying on floor in no acute distress. Baseline with severe intellectual disability, blindness, and aggression. Per staff they feel he is at his baseline and not altered in any way. No apparent loss of consciousness.   - could consider a head CT, however would require sedation which would likely cause more harm than good  - will need to consider imaging if he develops signs of head injury  - full vitals when he allows    FLORECITA Rodríguez, CNP  Hospitalist Service, House Officer  [facility]     Text Page  Pager: [phone]

## 2018-01-08 PROCEDURE — 12400006 ZZH R&B MH INTERMEDIATE

## 2018-01-08 PROCEDURE — 25000132 ZZH RX MED GY IP 250 OP 250 PS 637: Performed by: PSYCHIATRY & NEUROLOGY

## 2018-01-08 PROCEDURE — 40000914 ZZH STATISTIC SITTER, DAY HOURS

## 2018-01-08 PROCEDURE — 25000132 ZZH RX MED GY IP 250 OP 250 PS 637: Performed by: NURSE PRACTITIONER

## 2018-01-08 PROCEDURE — 40000915 ZZH STATISTIC SITTER, EVENING HOURS

## 2018-01-08 PROCEDURE — 25000132 ZZH RX MED GY IP 250 OP 250 PS 637: Performed by: INTERNAL MEDICINE

## 2018-01-08 RX ADMIN — SIMVASTATIN 20 MG: 10 TABLET, FILM COATED ORAL at 22:22

## 2018-01-08 RX ADMIN — QUETIAPINE FUMARATE 100 MG: 100 TABLET ORAL at 09:14

## 2018-01-08 RX ADMIN — Medication 15 ML: at 22:25

## 2018-01-08 RX ADMIN — QUETIAPINE FUMARATE 100 MG: 100 TABLET ORAL at 22:22

## 2018-01-08 RX ADMIN — AMOXICILLIN AND CLAVULANATE POTASSIUM 1 TABLET: 875; 125 TABLET, FILM COATED ORAL at 09:14

## 2018-01-08 RX ADMIN — AMOXICILLIN AND CLAVULANATE POTASSIUM 1 TABLET: 875; 125 TABLET, FILM COATED ORAL at 19:48

## 2018-01-08 RX ADMIN — RISPERIDONE 3 MG: 1 TABLET, ORALLY DISINTEGRATING ORAL at 22:22

## 2018-01-08 RX ADMIN — RISPERIDONE 1 MG: 1 TABLET, ORALLY DISINTEGRATING ORAL at 09:14

## 2018-01-08 RX ADMIN — VITAMIN D, TAB 1000IU (100/BT) 2000 UNITS: 25 TAB at 09:14

## 2018-01-08 RX ADMIN — LEVOTHYROXINE SODIUM 50 MCG: 50 TABLET ORAL at 09:14

## 2018-01-08 RX ADMIN — DORZOLAMIDE HYDROCHLORIDE AND TIMOLOL MALEATE 1 DROP: 20; 5 SOLUTION/ DROPS OPHTHALMIC at 09:15

## 2018-01-08 RX ADMIN — METOPROLOL SUCCINATE 25 MG: 25 TABLET, EXTENDED RELEASE ORAL at 09:15

## 2018-01-08 RX ADMIN — Medication 15 ML: at 09:15

## 2018-01-08 RX ADMIN — DORZOLAMIDE HYDROCHLORIDE AND TIMOLOL MALEATE 1 DROP: 20; 5 SOLUTION/ DROPS OPHTHALMIC at 16:19

## 2018-01-08 RX ADMIN — DIVALPROEX SODIUM 1000 MG: 500 TABLET, EXTENDED RELEASE ORAL at 19:48

## 2018-01-08 ASSESSMENT — ACTIVITIES OF DAILY LIVING (ADL)
DRESS: WITH ASSISTANCE
LAUNDRY: WITH SUPERVISION
ORAL_HYGIENE: WITH ASSISTANCE
GROOMING: WITH ASSISTANCE

## 2018-01-08 NOTE — PLAN OF CARE
Problem: Behavioral Disturbance  Goal: Behavioral Disturbance  1. Patient will take medication and mood become stable  2. Medication regime established  3. Follow up care in place and transition back to group home   Outcome: No Change  Slept most of shift. Awake only for supper and trips to bathroom. Voiding adequately. Ate 30% of meal. Left eye swollen. No evidence of pain. Up to bathroom with assist of 1-2. Vital signs stable.

## 2018-01-08 NOTE — PLAN OF CARE
Problem: Patient Care Overview  Goal: Team Discussion  Team Plan:   Outcome: Adequate for Discharge Date Met: 01/08/18  BEHAVIORAL TEAM DISCUSSION    Participants: Dr. Kwong, Nursing staff, Social workers and PA's  Progress: Baseline; adequate for D/C  Continued Stay Criteria/Rationale: Baseline; Aaequate for D/C   Medical/Physical:   Precautions:   Behavioral Orders   Procedures     Assault precautions     Code 1 - Restrict to Unit     Fall precautions     Routine Programming     As clinically indicated     Status 15     Every 15 minutes.     Plan: Call group home this week for D/C; needs a meeting set up through group home  Rationale for change in precautions or plan: Baseline; improving; D/C

## 2018-01-08 NOTE — PROGRESS NOTES
Spoke with Becky from Patient's group home.  She came to see patient today.  We will plan on discharge for Wednesday morning.  Staff will pick him up between 10 and 11 AM.  Patient's pharmacy is [facility] in [place].  I left a voice mail for patient's guardian Bernadette Kramershannon [phone] requesting a call back so I can update her on discharge Wednesday.      Bernadette, guardian, called back.  She would like to speak with Dr. Kwong before discharge Wednesday.  I told her I would ask him to call her tomorrow.  [phone].

## 2018-01-08 NOTE — PROGRESS NOTES
[facility]    Hospitalist Progress Note    Interval History   - No new acute issues, WBC down back to normal limits, no fevers  - Fall earlier today without injury  - Hospitalist service will sign off, please do not hesitate to contact us for any further questions    Assessment & Plan   Summary: Hugh Molina is a 67 year old male with PMH intellectual disability, DD, autism, HTN, HLD, hypothyroidism, blindness who was admitted on 12/28/2017 with aggressive behavior, agitation. Hospitalist service consulted for fever, lethargy, treating for aspiration PNA.    Aspiration pneumonia: Reported aspiration episode around ~1/1 with hypoxia requiring suctioning, followed by lethargy and fever 1/3. CXR shows scattered patchy b/l opacities, noted leukocytosis with WBC to 19.1k. Procalcitonin positive. Influenza negative. Given dose of Levaquin PO 1/3/18, transitioned to Unasyn IV until 1/5/18 with notable decreased in WBC count after starting Unasyn. Speech path consulted--no dysphagia.  - Complete course of antibiotics with Augmentin for 7 days ending 1/10/18    Aggressive behavior and agitation:  - Currently hospitalized by psychiatry    HLD: PTA simvastatin  HTN: PTA metoprolol with hold parameters  Hypothyroidism: TSH on admission 1.36. PTA levothyroxine.    DVT Prophylaxis: Low Risk/Ambulatory with no VTE prophylaxis indicated  Code Status: Full Code    Disposition: Per primary service psychiatry    Roosevelt Dennison MD  Text Page  (7am to 6pm)  -Data reviewed today: I reviewed all new labs and imaging results over the last 24 hours.    Physical Exam   Temp: 98  F (36.7  C) Temp src: Axillary BP: 130/68 Pulse: 83   Resp: 16 SpO2: 95 % O2 Device: None (Room air)    Vitals:    01/03/18 1900 01/06/18 1351   Weight: 75.1 kg (165 lb 8 oz) 76.4 kg (168 lb 8 oz)     Vital Signs with Ranges  Temp:  [97.3  F (36.3  C)-98.5  F (36.9  C)] 98  F (36.7  C)  Pulse:  [68-90] 83  Resp:  [15-18] 16  BP:  (123-143)/(54-68) 130/68  SpO2:  [93 %-95 %] 95 %  I/O last 3 completed shifts:  In: 240 [P.O.:240]  Out: -   O2 requirements: None    Constitutional: Male in NAD, sleeping  Cardiovascular: RRR, normal S1/2, no m/r/g  Respiratory: CTAB, no wheezing or crackles  Skin/Integumen: No rashes or scars    Medications        amoxicillin-clavulanate  1 tablet Oral Q12H DELONTE     divalproex  1,000 mg Oral QPM     QUEtiapine  100 mg Oral TID     chlorhexidine  15 mL Mouth/Throat BID     cholecalciferol (vitamin D3) tablet 2,000 Units  2,000 Units Oral Daily     dorzolamide-timolol  1 drop Right Eye BID     levothyroxine (SYNTHROID/LEVOTHROID) tablet 50 mcg  50 mcg Oral Daily     metoprolol (TOPROL-XL) 24 hr tablet 25 mg  25 mg Oral Daily     risperiDONE (risperDAL M-TABS) ODT tab 1 mg  1 mg Oral QAM     simvastatin (ZOCOR) tablet 20 mg  20 mg Oral At Bedtime     Risperidone  3 mg Sublingual At Bedtime       Data     Recent Labs  Lab 01/07/18  0745 01/05/18  1142 01/04/18  0821 01/03/18  1700   WBC 8.9 13.5* 18.1* 19.1*   HGB 12.2* 12.5* 13.6 13.8   MCV 92 92 93 92    178 175 171   NA  --   --  140 140   POTASSIUM  --   --  3.6 3.9   CHLORIDE  --   --  106 107   CO2  --   --  27 25   BUN  --   --  21 25   CR 0.52*  --  0.69 0.61*   ANIONGAP  --   --  7 8   WENDI  --   --  9.7 9.6   GLC  --   --  104* 103*   ALBUMIN  --   --   --  2.8*   PROTTOTAL  --   --   --  6.8   BILITOTAL  --   --   --  1.1   ALKPHOS  --   --   --  73   ALT  --   --   --  20   AST  --   --   --  32       Imaging:   No results found for this or any previous visit (from the past 24 hour(s)).

## 2018-01-08 NOTE — PROGRESS NOTES
Patient likes to lay down on his left side, and now his left eye is swollen and left shoulder red , no break on skin.  Patient was helped to the recliner and will continue to monitor.

## 2018-01-09 VITALS
DIASTOLIC BLOOD PRESSURE: 62 MMHG | TEMPERATURE: 98.7 F | WEIGHT: 167.7 LBS | SYSTOLIC BLOOD PRESSURE: 121 MMHG | RESPIRATION RATE: 17 BRPM | HEART RATE: 79 BPM | BODY MASS INDEX: 24.01 KG/M2 | HEIGHT: 70 IN | OXYGEN SATURATION: 91 %

## 2018-01-09 PROCEDURE — 40000916 ZZH STATISTIC SITTER, NIGHT HOURS: Performed by: PSYCHIATRY & NEUROLOGY

## 2018-01-09 PROCEDURE — 25000132 ZZH RX MED GY IP 250 OP 250 PS 637: Performed by: INTERNAL MEDICINE

## 2018-01-09 PROCEDURE — 25000132 ZZH RX MED GY IP 250 OP 250 PS 637: Performed by: PSYCHIATRY & NEUROLOGY

## 2018-01-09 PROCEDURE — 25000132 ZZH RX MED GY IP 250 OP 250 PS 637: Performed by: NURSE PRACTITIONER

## 2018-01-09 PROCEDURE — 12400006 ZZH R&B MH INTERMEDIATE

## 2018-01-09 PROCEDURE — 40000916 ZZH STATISTIC SITTER, NIGHT HOURS

## 2018-01-09 PROCEDURE — 40000914 ZZH STATISTIC SITTER, DAY HOURS

## 2018-01-09 PROCEDURE — 40000915 ZZH STATISTIC SITTER, EVENING HOURS

## 2018-01-09 RX ORDER — RISPERIDONE 3 MG/1
3 TABLET, ORALLY DISINTEGRATING ORAL AT BEDTIME
Qty: 30 TABLET | Refills: 0 | Status: SHIPPED | OUTPATIENT
Start: 2018-01-09

## 2018-01-09 RX ORDER — DIVALPROEX SODIUM 500 MG/1
1000 TABLET, EXTENDED RELEASE ORAL EVERY EVENING
Qty: 60 TABLET | Refills: 0 | Status: SHIPPED | OUTPATIENT
Start: 2018-01-09

## 2018-01-09 RX ORDER — RISPERIDONE 1 MG/1
1 TABLET, ORALLY DISINTEGRATING ORAL EVERY MORNING
Qty: 30 TABLET | Refills: 0 | Status: SHIPPED | OUTPATIENT
Start: 2018-01-10

## 2018-01-09 RX ORDER — QUETIAPINE FUMARATE 100 MG/1
100 TABLET, FILM COATED ORAL 3 TIMES DAILY
Qty: 90 TABLET | Refills: 0 | Status: SHIPPED | OUTPATIENT
Start: 2018-01-09

## 2018-01-09 RX ADMIN — DIVALPROEX SODIUM 1000 MG: 500 TABLET, EXTENDED RELEASE ORAL at 21:17

## 2018-01-09 RX ADMIN — RISPERIDONE 3 MG: 1 TABLET, ORALLY DISINTEGRATING ORAL at 21:17

## 2018-01-09 RX ADMIN — QUETIAPINE FUMARATE 100 MG: 100 TABLET ORAL at 08:46

## 2018-01-09 RX ADMIN — QUETIAPINE FUMARATE 100 MG: 100 TABLET ORAL at 17:31

## 2018-01-09 RX ADMIN — SIMVASTATIN 20 MG: 10 TABLET, FILM COATED ORAL at 21:17

## 2018-01-09 RX ADMIN — AMOXICILLIN AND CLAVULANATE POTASSIUM 1 TABLET: 875; 125 TABLET, FILM COATED ORAL at 21:18

## 2018-01-09 RX ADMIN — DORZOLAMIDE HYDROCHLORIDE AND TIMOLOL MALEATE 1 DROP: 20; 5 SOLUTION/ DROPS OPHTHALMIC at 08:46

## 2018-01-09 RX ADMIN — RISPERIDONE 1 MG: 1 TABLET, ORALLY DISINTEGRATING ORAL at 08:46

## 2018-01-09 RX ADMIN — AMOXICILLIN AND CLAVULANATE POTASSIUM 1 TABLET: 875; 125 TABLET, FILM COATED ORAL at 08:46

## 2018-01-09 RX ADMIN — QUETIAPINE FUMARATE 100 MG: 100 TABLET ORAL at 21:17

## 2018-01-09 RX ADMIN — DORZOLAMIDE HYDROCHLORIDE AND TIMOLOL MALEATE 1 DROP: 20; 5 SOLUTION/ DROPS OPHTHALMIC at 17:31

## 2018-01-09 RX ADMIN — VITAMIN D, TAB 1000IU (100/BT) 2000 UNITS: 25 TAB at 08:46

## 2018-01-09 RX ADMIN — LEVOTHYROXINE SODIUM 50 MCG: 50 TABLET ORAL at 08:46

## 2018-01-09 RX ADMIN — METOPROLOL SUCCINATE 25 MG: 25 TABLET, EXTENDED RELEASE ORAL at 08:46

## 2018-01-09 ASSESSMENT — ACTIVITIES OF DAILY LIVING (ADL)
DRESS: SCRUBS (BEHAVIORAL HEALTH);WITH ASSISTANCE
GROOMING: WITH ASSISTANCE
LAUNDRY: UNABLE TO COMPLETE
ORAL_HYGIENE: WITH ASSISTANCE

## 2018-01-09 NOTE — PLAN OF CARE
Problem: Behavioral Disturbance  Goal: Behavioral Disturbance  1. Patient will take medication and mood become stable  2. Medication regime established  3. Follow up care in place and transition back to group home   Outcome: Improving  Pt up to bathroom x3. Medium, formed BM produced along with adequate amount of yellow urine. Sheets changed on bed. Pt drank 2 ensure shakes, a prune juice and 35% of his dinner with encouragement. Takes pills with applesauce and liquids. Pt able to sit in gladys-chair most of shift when resting. L side swelling appears to be resolving. 1600 Seroquel not given because Pt appeared too sedated.

## 2018-01-09 NOTE — PROGRESS NOTES
[facility] Psychiatric Progress Note      Interim History:   Pt seen, team meeting held nursing staff.  Pt has been sleeping and waking up only intermittently. He is able to ambulate with assist x2, eating and voiding. He is being treated for suspected aspiration pneumonia Augmentin. Pt tolerating Seroque 100mg tid and Depakote 1,000mg hs. Will contact Group home to come and assess his progress with plan to discharge by Wednesday.      Review of systems:   Unable to be obtained on account of patient's mental status      Medications:       amoxicillin-clavulanate  1 tablet Oral Q12H DELONTE     divalproex  1,000 mg Oral QPM     QUEtiapine  100 mg Oral TID     chlorhexidine  15 mL Mouth/Throat BID     cholecalciferol (vitamin D3) tablet 2,000 Units  2,000 Units Oral Daily     dorzolamide-timolol  1 drop Right Eye BID     levothyroxine (SYNTHROID/LEVOTHROID) tablet 50 mcg  50 mcg Oral Daily     metoprolol (TOPROL-XL) 24 hr tablet 25 mg  25 mg Oral Daily     risperiDONE (risperDAL M-TABS) ODT tab 1 mg  1 mg Oral QAM     simvastatin (ZOCOR) tablet 20 mg  20 mg Oral At Bedtime     Risperidone  3 mg Sublingual At Bedtime     sennosides, LORazepam, haloperidol lactate, hydrOXYzine, acetaminophen, diphenhydrAMINE (BENADRYL) capsule 50 mg, hydrocortisone, triamcinolone    Mental Status Examination:     Appearance Laying in bed, dressed in scrubs. Appears stated age. Disheveled   Attitude Uncooperative, redirectable   Orientation Possibly oriented to person   Eye Contact Pt is blind   Speech Paucity of speech, short responses   Language Normal   Psychomotor Behavior Agitated at times   Mood More stable, somewhat irritable   Affect Irritable and striking out   Thought Process Disorganized, paucity of spontaneous thought   Associations Fair   Thought Content Patient is currently negative for suicidal or homicidal ideation.   Fund of Knowledge Impaired   Insight Poor   Judgement Poor   Attention Span & Concentration  Limited   Recent & Remote Memory Limited   Gait Unsteady   Muscle Tone Intact on visual inspection         Labs/Vitals:     No results found for this or any previous visit (from the past 24 hour(s)).  B/P: 129/82, T: 97.8, P: 81, R: 16    Impression:   Hugh remains highly agitated at times despite three atypical antipsychotics.     12/28/17  Pt was admitted for reportedly escalating violent behavior at his group home. Trial of Depakote 500mg qhs was started.    12/29/17  Pt continues to be agitated, but redirectable. He has been sleeping intermittently. When awake, he grabs staff but does not attempt to hit them. Depakote was increased to 1000mg qhs.    1/1/18  Pt was highly agitated in the am and required restraints. He was given Ativan and Zyprexa IM together without proper communication and resulted in extreme sedation. IM Haldol and Ativan were provided.    1/2/18  Pt is less agitated this morning but was unable to swallow properly or ambulate without assistance. Discharge was deferred and group home was notified. Discontinued Zyprexa PRN.    1/3/18  Pt was more alert this morning and redirectable. He will remain until he has further stabilized.    1/4/18  Pt had been sleeping more often, but remained about baseline cognition. He is now being treated for pneumonia with IV antibiotics. Pt has been more cooperative and much less agitated spells. More readily redirected.  Will need to contact Group home for care conference next week.      DIagnoses:   1. Autism Spectrum   2. Intellectual Disability formerly known as MR  3. Likely Dementia   4. Blind  5. Possible aspiration pneumonia         Plan:   1. Continue current medications.  2. Continue hospitalization.  3. D/C planning for Wed. Group home to come and reassess if patient close to baseline.     Attestation:  Patient has been seen and evaluated by me,  Justice Kwong MD

## 2018-01-09 NOTE — PLAN OF CARE
Problem: General Rehab Plan of Care  Goal: Occupational Therapy Goals  The patient and/or their representative will achieve their patient-specific goals related to the plan of care.  The patient-specific goals include:   Pt has not attended OT groups secondary to pneumonia and high level of assistance required for mobility.

## 2018-01-09 NOTE — PROGRESS NOTES
[facility] Psychiatric Progress Note      Interim History:   Pt seen, team meeting held nursing staff.  Pt has been ambulating and voiding adequately, tolerating medications. Plan to contact pt's guardian regarding discharge planning.     Review of systems:   Unable to be obtained on account of patient's mental status      Medications:       amoxicillin-clavulanate  1 tablet Oral Q12H DELONTE     divalproex  1,000 mg Oral QPM     QUEtiapine  100 mg Oral TID     chlorhexidine  15 mL Mouth/Throat BID     cholecalciferol (vitamin D3) tablet 2,000 Units  2,000 Units Oral Daily     dorzolamide-timolol  1 drop Right Eye BID     levothyroxine (SYNTHROID/LEVOTHROID) tablet 50 mcg  50 mcg Oral Daily     metoprolol (TOPROL-XL) 24 hr tablet 25 mg  25 mg Oral Daily     risperiDONE (risperDAL M-TABS) ODT tab 1 mg  1 mg Oral QAM     simvastatin (ZOCOR) tablet 20 mg  20 mg Oral At Bedtime     Risperidone  3 mg Sublingual At Bedtime     sennosides, LORazepam, haloperidol lactate, hydrOXYzine, acetaminophen, diphenhydrAMINE (BENADRYL) capsule 50 mg, hydrocortisone, triamcinolone    Mental Status Examination:     Appearance Sitting in chair, dressed in scrubs. Appears stated age. Disheveled   Attitude Uncooperative, redirectable   Orientation Possibly oriented to person   Eye Contact Pt is blind   Speech Paucity of speech, short responses   Language Impaired   Psychomotor Behavior Agitated at times   Mood More stable, somewhat irritable   Affect Irritable and striking out   Thought Process Disorganized, paucity of spontaneous thought   Associations Fair   Thought Content Patient is currently negative for suicidal or homicidal ideation.   Fund of Knowledge Impaired   Insight Poor   Judgement Poor   Attention Span & Concentration Limited   Recent & Remote Memory Limited   Gait Unsteady   Muscle Tone Intact on visual inspection         Labs/Vitals:     No results found for this or any previous visit (from the past 24  hour(s)).  B/P: 129/82, T: 97.8, P: 81, R: 16    Impression:   Hugh remains highly agitated at times despite three atypical antipsychotics.     12/28/17  Pt was admitted for reportedly escalating violent behavior at his group home. Trial of Depakote 500mg qhs was started.    12/29/17  Pt continues to be agitated, but redirectable. He has been sleeping intermittently. When awake, he grabs staff but does not attempt to hit them. Depakote was increased to 1000mg qhs.    1/1/18  Pt was highly agitated in the am and required restraints. He was given Ativan and Zyprexa IM together without proper communication and resulted in extreme sedation. IM Haldol and Ativan were provided.    1/2/18  Pt is less agitated this morning but was unable to swallow properly or ambulate without assistance. Discharge was deferred and group home was notified. Discontinued Zyprexa PRN.    1/3/18  Pt was more alert this morning and redirectable. He will remain until he has further stabilized.    1/4/18  Pt had been sleeping more often, but remained about baseline cognition. He is now being treated for pneumonia with IV antibiotics. Pt has been more cooperative and much less agitated spells. More readily redirected.  Will need to contact Group home for care conference next week.    1/5/18  Pt was about the same, sleeping and waking up with assistance.    1/8/18  Pt tolerating medications, continues to be treated for pneumonia. Planned for assessment with group home and discharge later in the week.    1/9/18  Pt is doing about the same, planned to contact pt's guardian regarding discharge planning.    DIagnoses:   1. Autism Spectrum   2. Intellectual Disability formerly known as MR  3. Likely Dementia   4. Blind  5. Possible aspiration pneumonia         Plan:   1. Continue current medications.  2. Continue hospitalization.  3. D/C planning for Wed. Group home to come and reassess if patient close to baseline.     Attestation:  Patient has been  seen and evaluated by me,  Justice Kwong MD

## 2018-01-09 NOTE — PLAN OF CARE
Problem: Behavioral Disturbance  Goal: Behavioral Disturbance  1. Patient will take medication and mood become stable  2. Medication regime established  3. Follow up care in place and transition back to group home   Outcome: Improving  Patient got up for meals and to go to the bathroom. Took fluids well, drank milk and orange juice in the morning and ate 10% of breakfast. Ate a peanut butter and jelly sandwich for snack and had a milk. Ate 60% of lunch and drank juice and ensure. Patient had a large BM, soft and formed and had 2 urine occurences. Worked with his bead for a short while. Patient insisted on going to bed after using the bathroom and eating meals. Patient walked the lounge a few times.

## 2018-01-09 NOTE — PLAN OF CARE
Problem: Patient Care Overview  Goal: Discharge Needs Assessment   received a call this afternoon from Ervin Herbert,  at patient's group home. She is requesting that  time be moved back to 1200 noon as staff who will be picking up patient is not available earlier. Medications will need to go to [facility] in [place].  contacted physician's scribe to notify physician of planned discharge time and see if discharge orders can be put in today and medications ordered to [facility] so that patient is ready to go by  time tomorrow.

## 2018-01-10 PROCEDURE — 25000132 ZZH RX MED GY IP 250 OP 250 PS 637: Performed by: INTERNAL MEDICINE

## 2018-01-10 PROCEDURE — 40000914 ZZH STATISTIC SITTER, DAY HOURS

## 2018-01-10 PROCEDURE — 25000132 ZZH RX MED GY IP 250 OP 250 PS 637: Performed by: NURSE PRACTITIONER

## 2018-01-10 PROCEDURE — 25000132 ZZH RX MED GY IP 250 OP 250 PS 637: Performed by: PSYCHIATRY & NEUROLOGY

## 2018-01-10 RX ADMIN — DORZOLAMIDE HYDROCHLORIDE AND TIMOLOL MALEATE 1 DROP: 20; 5 SOLUTION/ DROPS OPHTHALMIC at 08:27

## 2018-01-10 RX ADMIN — AMOXICILLIN AND CLAVULANATE POTASSIUM 1 TABLET: 875; 125 TABLET, FILM COATED ORAL at 08:27

## 2018-01-10 RX ADMIN — VITAMIN D, TAB 1000IU (100/BT) 2000 UNITS: 25 TAB at 08:27

## 2018-01-10 RX ADMIN — RISPERIDONE 1 MG: 1 TABLET, ORALLY DISINTEGRATING ORAL at 08:27

## 2018-01-10 RX ADMIN — LEVOTHYROXINE SODIUM 50 MCG: 50 TABLET ORAL at 08:27

## 2018-01-10 RX ADMIN — QUETIAPINE FUMARATE 100 MG: 100 TABLET ORAL at 08:27

## 2018-01-10 RX ADMIN — METOPROLOL SUCCINATE 25 MG: 25 TABLET, EXTENDED RELEASE ORAL at 08:27

## 2018-01-10 RX ADMIN — Medication 15 ML: at 08:27

## 2018-01-10 ASSESSMENT — ACTIVITIES OF DAILY LIVING (ADL)
ORAL_HYGIENE: WITH ASSISTANCE
ORAL_HYGIENE: WITH ASSISTANCE
GROOMING: WITH ASSISTANCE
DRESS: WITH ASSISTANCE
LAUNDRY: WITH SUPERVISION
GROOMING: WITH ASSISTANCE

## 2018-01-10 NOTE — PROGRESS NOTES
Patient ate 90% of breakfast and used the bathroom. Had a BM and voided. Aroused easily when his ride came. He was very happy to have a familiar person and to have a van ride. Discharge instructions were given to the group home staff Becky. Nurse also spoke with the . They inquired about how to get access to his medical records, they were given the number of medical records and an authorization form for the guardian to fill out. Guardian was also called to inform her that the patient will be discharging. AVS was faxed to her. Patient was not able to participate in suicide assessment due to his delayed cognition but his behaviors of hitting his head has decreased to almost nothing. Patient left the unit as 12:58 with group home staff.

## 2018-01-10 NOTE — PLAN OF CARE
Problem: Behavioral Disturbance  Goal: Behavioral Disturbance  1. Patient will take medication and mood become stable  2. Medication regime established  3. Follow up care in place and transition back to group home   Pt. has been bed resting on and off all evening. Up to Bathroom, for dinner and for walk in the lounge. Assist with ADL's provided. Remains on 1:1. Anticipated discharge tomorrow.

## 2018-01-10 NOTE — PLAN OF CARE
Problem: Patient Care Overview  Goal: Individualization & Mutuality  Alert , oriented to self, up for meals and cares, needs reassurance and guidance. Yells at times, ambulated.   He will be discharge to his group home soon this morning.

## 2018-01-10 NOTE — PROGRESS NOTES
[facility] Psychiatric Progress Note      Interim History:   Pt seen, team meeting held nursing staff.  Spoke with pt's guardian this morning, she is agreement with discharge today. Pt is baseline cognition and has not been physically combative. 30 day supply of medication provided at time of discharge. He will discharge back to his group home.     Review of systems:   Unable to be obtained on account of patient's mental status      Medications:       amoxicillin-clavulanate  1 tablet Oral Q12H DELONTE     divalproex  1,000 mg Oral QPM     QUEtiapine  100 mg Oral TID     chlorhexidine  15 mL Mouth/Throat BID     cholecalciferol (vitamin D3) tablet 2,000 Units  2,000 Units Oral Daily     dorzolamide-timolol  1 drop Right Eye BID     levothyroxine (SYNTHROID/LEVOTHROID) tablet 50 mcg  50 mcg Oral Daily     metoprolol (TOPROL-XL) 24 hr tablet 25 mg  25 mg Oral Daily     risperiDONE (risperDAL M-TABS) ODT tab 1 mg  1 mg Oral QAM     simvastatin (ZOCOR) tablet 20 mg  20 mg Oral At Bedtime     Risperidone  3 mg Sublingual At Bedtime     sennosides, LORazepam, haloperidol lactate, hydrOXYzine, acetaminophen, diphenhydrAMINE (BENADRYL) capsule 50 mg, hydrocortisone, triamcinolone    Mental Status Examination:     Appearance Sitting in chair, dressed in scrubs. Appears stated age. Disheveled   Attitude Uncooperative, redirectable, not combative   Orientation Possibly oriented to person   Eye Contact Pt is blind   Speech Paucity of speech, short responses   Language Impaired   Psychomotor Behavior Agitated at times   Mood More stable, somewhat irritable   Affect Agitated at times   Thought Process Disorganized, paucity of spontaneous thought   Associations Fair   Thought Content Patient is currently negative for suicidal or homicidal ideation.   Fund of Knowledge Below average   Insight Poor   Judgement Poor   Attention Span & Concentration Limited   Recent & Remote Memory Limited   Gait Normal   Muscle Tone  Intact on visual inspection         Labs/Vitals:     No results found for this or any previous visit (from the past 24 hour(s)).  B/P: 129/82, T: 97.8, P: 81, R: 16    Impression:   Hugh remains highly agitated at times despite three atypical antipsychotics.     12/28/17  Pt was admitted for reportedly escalating violent behavior at his group home. Trial of Depakote 500mg qhs was started.    12/29/17  Pt continues to be agitated, but redirectable. He has been sleeping intermittently. When awake, he grabs staff but does not attempt to hit them. Depakote was increased to 1000mg qhs.    1/1/18  Pt was highly agitated in the am and required restraints. He was given Ativan and Zyprexa IM together without proper communication and resulted in extreme sedation. IM Haldol and Ativan were provided.    1/2/18  Pt is less agitated this morning but was unable to swallow properly or ambulate without assistance. Discharge was deferred and group home was notified. Discontinued Zyprexa PRN.    1/3/18  Pt was more alert this morning and redirectable. He will remain until he has further stabilized.    1/4/18  Pt had been sleeping more often, but remained about baseline cognition. He is now being treated for pneumonia with IV antibiotics. Pt has been more cooperative and much less agitated spells. More readily redirected.  Will need to contact Group home for care conference next week.    1/5/18  Pt was about the same, sleeping and waking up with assistance.    1/8/18  Pt tolerating medications, continues to be treated for pneumonia. Planned for assessment with group home and discharge later in the week.    1/9/18  Pt is doing about the same, planned to contact pt's guardian regarding discharge planning.    1/10/18  Pt was baseline cognition and cleared for discharge with his guardian. He will return to his group home.      DIagnoses:   1. Autism Spectrum   2. Intellectual Disability formerly known as MR  3. Likely Dementia    4. Blind  5. Possible aspiration pneumonia         Plan:   1. Continue current medications.  2. Pt to discharge today.  3. 30 day supply of medication provided at time of discharge.   4. Pt to follow-up with Dr. Kwong at [facility] within 4 weeks.      Attestation:  Patient has been seen and evaluated by me,  Justice Kwong MD

## 2018-01-10 NOTE — DISCHARGE INSTRUCTIONS
Behavioral Discharge Planning and Instructions    Summary:  Admitted with increasing aggressive behavior, agitation, inappropriate behavior and insomnia from his group home    Main Diagnosis:   Autism Spectrum; Intellectual Disability formerly known as MR; Likely Dementia; Blind    Major Treatments, Procedures and Findings:  Psychiatric assessment. Medication adjustment.     Symptoms to Report: Feeling more aggressive, Increased confusion, Losing more sleep, Mood getting worse or Thoughts of suicide    Lifestyle Adjustment:  Follow all treatment recommendations, including going to all doctor appointments and taking medications as prescribed. Develop and follow safety plan with group home staff.     Psychiatry Follow-up:     Your group home staff will assist you in setting up a follow up appointment with Dr. Justice Kwong at [facility] within the next 30 days so that you can get your medications refilled.     [facility]  [address], [address]  [place]  [address]  Phone:  [phone] / Fax:  [phone]    Your legal guardian is Bernadette Fonscea. She can be reached at [phone].     You will be returning to your current group home.  Main number for group home is [phone]. Contact at your group home is Becky and she can be reached at [phone].     Medications will be sent to [facility] in [place].     Resources:   Crisis Intervention: 526.400.5134 or 049-419-5961 (TTY: 980.300.7590).  Call anytime for help.  National [place] [employer] Mental Illness (www.[place].laron.org): 171.992.1095 or 776-802-8674.  National Suicide Prevention Line (www.mentalhealthmn.org): 407-316-TYPD (7251)  COPE (Crisis Services for Adults) in [facility]: 835.358.6883 (Available 24/7)    General Medication Instructions:   See your medication sheet(s) for instructions.   Take all medicines as directed.  Make no changes unless your doctor suggests them.   Go to all your doctor visits.  Be sure to  "have all your required lab tests. This way, your medicines can be refilled on time.  Do not use any drugs not prescribed by your doctor.  Avoid alcohol.    Weight: 1/3/18 165.8 - 169.98 1/10/18    To access medical records, please have the guardian fill out the \"Authorization for Protected Health Information.\" The number for Medical records is [phone]  "

## 2018-01-15 NOTE — DISCHARGE SUMMARY
"[facility] Psychiatric Discharge Summary      DATE OF ADMISSION: 12/28/2017     DATE OF DISCHARGE: 1/10/2018    PRIMARY CARE PHYSICIAN: Gregory Pool    IDENTIFICATION: Patient is a 67 year old male. Pt sees PCP Gregory Cali. He presently lives in a group home. For history, see dictation by Dr. Kwong on 12/28/17. For physical summary, see dictation by FLORECITA Steve CNP on 12/28/17.     HOSPITAL COURSE: Mr Molina has been in a group home for many decades. He was maintained on Risperdal for most of the past 2 decades.  He is profoundly cognitively impaired, blind, and is directable with staff but very limited communication skills. He will often point to his eyes and repeat \"Eyes, Eyes, Eyes\". Largely he is cooperative with a lot of guidance. He has had one episode where he got extremely agitated at Dr Kwong's office with no provocation an damaged the desk,  He doesn't usually injure people. The past few months he has become more trouble some. He has been pulling his clothes off falling down and hitting staff. Risperdal was increased to 3 mg and later cogentin was added and he improved only slightly. His behaviors escalated and he came to office visit with Dr. Kwong at [facility] and he was extremely agitated and non-directable pt was sent to [facility] for admission to [facility] through the ER.  He was extremely upset banging on the walls at the ER ws given Haldol 10mg and Koedtr3et and he settled down but was sill wide awake and transferred to [facility],    12/28/17  Pt was admitted for reportedly escalating violent behavior at his group home. Trial of Depakote 500mg qhs was started.     12/29/17  Pt continues to be agitated, but redirectable. He has been sleeping intermittently. When awake, he grabs staff but does not attempt to hit them. Depakote was increased to 1000mg qhs.     1/1/18  Pt was highly agitated in the am and required restraints. He " was given Ativan and Zyprexa IM together without proper communication and resulted in extreme sedation. IM Haldol and Ativan were provided.     1/2/18  Pt is less agitated this morning but was unable to swallow properly or ambulate without assistance. Discharge was deferred and group home was notified. Discontinued Zyprexa PRN.     1/3/18  Pt was more alert this morning and redirectable. He will remain until he has further stabilized.     1/4/18  Pt had been sleeping more often, but remained about baseline cognition. He is now being treated for pneumonia with IV antibiotics. Pt has been more cooperative and much less agitated spells. More readily redirected.  Will need to contact Group home for care conference next week.     1/5/18  Pt was about the same, sleeping and waking up with assistance.     1/8/18  Pt tolerating medications, continues to be treated for pneumonia. Planned for assessment with group home and discharge later in the week.     1/9/18  Pt is doing about the same, planned to contact pt's guardian regarding discharge planning.     1/10/18  Pt was baseline cognition and cleared for discharge with his guardian. He will return to his group home.    DISCHARGE MENTAL STATUS EXAMINATION:    Appearance Sitting in chair, dressed in scrubs. Appears stated age. Disheveled   Attitude Uncooperative, redirectable, not combative   Orientation Possibly oriented to person   Eye Contact Pt is blind   Speech Paucity of speech, short responses   Language Impaired   Psychomotor Behavior Agitated at times   Mood More stable, somewhat irritable   Affect Agitated at times   Thought Process Disorganized, paucity of spontaneous thought   Associations Fair   Thought Content Patient is currently negative for suicidal or homicidal ideation.   Fund of Knowledge Below average   Insight Poor   Judgement Poor   Attention Span & Concentration Limited   Recent & Remote Memory Limited   Gait Normal   Muscle Tone Intact on visual  "inspection         LABORATORY DATA:    Refer to hospitalist admission dictation.  No results found for this or any previous visit (from the past 24 hour(s)).     /62  Pulse 79  Temp 98.7  F (37.1  C) (Axillary)  Resp 17  Ht 1.778 m (5' 10\")  Wt 76.1 kg (167 lb 11.2 oz)  SpO2 91%  BMI 24.06 kg/m2     DISCHARGE MEDICATIONS:      Review of your medicines      START taking       Dose / Directions    amoxicillin-clavulanate 875-125 MG per tablet   Commonly known as:  AUGMENTIN   Indication:  Pneumonia caused by Inhaling a Substance Into the Lungs   Used for:  Meconium aspiration pneumonia, unspecified laterality, unspecified part of lung        Dose:  1 tablet   Take 1 tablet by mouth every 12 hours   Quantity:  1 tablet   Refills:  0       divalproex 500 MG 24 hr tablet   Commonly known as:  DEPAKOTE ER   Used for:  Active autistic disorder        Dose:  1000 mg   Take 2 tablets (1,000 mg) by mouth every evening   Quantity:  60 tablet   Refills:  0       QUEtiapine 100 MG tablet   Commonly known as:  SEROquel   Used for:  Active autistic disorder        Dose:  100 mg   Take 1 tablet (100 mg) by mouth 3 times daily   Quantity:  90 tablet   Refills:  0         CONTINUE these medicines which may have CHANGED, or have new prescriptions. If we are uncertain of the size of tablets/capsules you have at home, strength may be listed as something that might have changed.       Dose / Directions    * risperiDONE 3 MG Tbdp ODT tab   This may have changed:    - medication strength  - how to take this   Used for:  Active autistic disorder        Dose:  3 mg   Place 1 tablet (3 mg) under the tongue At Bedtime   Quantity:  30 tablet   Refills:  0       * risperiDONE 1 MG ODT tab   Commonly known as:  risperDAL M-TABS   This may have changed:  medication strength   Used for:  Active autistic disorder        Dose:  1 mg   Take 1 tablet (1 mg) by mouth every morning   Quantity:  30 tablet   Refills:  0       * Notice:  This " list has 2 medication(s) that are the same as other medications prescribed for you. Read the directions carefully, and ask your doctor or other care provider to review them with you.      CONTINUE these medicines which have NOT CHANGED       Dose / Directions    BENADRYL PO        Dose:  50 mg   Take 50 mg by mouth every 6 hours as needed for itching (and rash)   Refills:  0       chlorhexidine 0.12 % solution   Commonly known as:  PERIDEX        Dose:  15 mL   Take 15 mLs by mouth 2 times daily   Refills:  0       COSOPT 2-0.5 % ophthalmic solution   Generic drug:  dorzolamide-timolol        Dose:  1 drop   Place 1 drop into the right eye 2 times daily.   Refills:  0       hydrocortisone 1 % cream   Commonly known as:  CORTAID        Apply topically 4 times daily as needed for itching   Refills:  0       LEVOTHYROXINE SODIUM PO        Dose:  50 mcg   Take 50 mcg by mouth daily   Refills:  0       METOPROLOL SUCCINATE ER PO        Dose:  25 mg   Take 25 mg by mouth daily   Refills:  0       SIMVASTATIN PO        Dose:  20 mg   Take 20 mg by mouth daily.   Refills:  0       triamcinolone 0.1 % cream   Commonly known as:  KENALOG        Apply topically 3 times daily as needed for irritation (rash)   Refills:  0       VITAMIN D3 PO        Dose:  2000 Units   Take 2,000 Units by mouth daily   Refills:  0            Where to get your medicines      These medications were sent to [facility] - [place], [place] - [address] [address]     Phone:  [phone]      amoxicillin-clavulanate 875-125 MG per tablet     divalproex 500 MG 24 hr tablet     QUEtiapine 100 MG tablet     risperiDONE 1 MG ODT tab     risperiDONE 3 MG Tbdp ODT tab             DISCHARGE DIAGNOSES:    1. Autism Spectrum   2. Intellectual Disability formerly known as MR  3. Likely Dementia   4. Blind  5. Possible aspiration pneumonia    DISCHARGE PLAN:     1. Continue current medications.  2. Pt to  discharge today.  3. 30 day supply of medication provided at time of discharge.   4. Pt to follow-up with Dr. Kwong at [facility] within 4 weeks.      DISCHARGE FOLLOW-UP:    Group home staff will assist pt in setting up a follow up appointment with Dr. Justice Kwong at [facility] within the next 30 days so that medications can be refilled.      [facility]  [address], [address]  [place]  [address]  Phone:  [phone] / Fax:  [phone]     Legal guardian is Bernadette Fonseca. She can be reached at [phone].      Pt will be returning to your current group home.  Main number for group home is [phone]. Contact at  group home is Becky and she can be reached at [phone].      Medications will be sent to [facility] in [place].     Attestation:   Patient has been seen and evaluated by me, Justice Kwong MD.    Patient ID:    Name: Hugh Molina    MRN: [id number]  Admission: 12/28/2017   YOB: 1950

## 2023-06-09 NOTE — ED NOTES
"Patient changed into scrubs with assistance of  staff. Patient became slightly more agitated, yelling \"no\" and pacing walls. Patient given tactile stimulation to soothe patient per  staff recommendations. Will attempt labs and vitals when calm again  " Adult